# Patient Record
Sex: FEMALE | Race: WHITE | NOT HISPANIC OR LATINO | Employment: OTHER
[De-identification: names, ages, dates, MRNs, and addresses within clinical notes are randomized per-mention and may not be internally consistent; named-entity substitution may affect disease eponyms.]

---

## 2023-08-07 ENCOUNTER — TRANSCRIBE ORDERS (OUTPATIENT)
Dept: OTHER | Age: 83
End: 2023-08-07

## 2023-08-07 DIAGNOSIS — K44.9 HIATAL HERNIA WITH GASTROESOPHAGEAL REFLUX: Primary | ICD-10-CM

## 2023-08-07 DIAGNOSIS — K21.9 HIATAL HERNIA WITH GASTROESOPHAGEAL REFLUX: Primary | ICD-10-CM

## 2023-08-11 ENCOUNTER — PATIENT OUTREACH (OUTPATIENT)
Dept: ONCOLOGY | Facility: CLINIC | Age: 83
End: 2023-08-11
Payer: MEDICARE

## 2023-08-11 ENCOUNTER — TRANSCRIBE ORDERS (OUTPATIENT)
Dept: ONCOLOGY | Facility: CLINIC | Age: 83
End: 2023-08-11
Payer: MEDICARE

## 2023-08-11 DIAGNOSIS — K44.9 HIATAL HERNIA: Primary | ICD-10-CM

## 2023-08-11 NOTE — PROGRESS NOTES
New Patient Thoracic Surgery Nurse Navigator Note     Referring provider:  Briseida Lyons MD      Referring Clinic/Organization: [facility]     Referred to (specialty): Thoracic Surgery    Requested provider (if applicable): n/a     Date Referral Received: 8/11/2023     Evaluation for : hiatal hernia     Clinical History (per Nurse review of records provided):    **BOOK MARKED**     7/28/2023:  CT ANGIO CHEST  [facility]  FINDINGS: No acute pulmonary embolus.     6 mm groundglass nodule right upper lobe (6/127) stable since 10/7/2021.     Diffuse bronchial wall thickening. Lungs in expiration with expected scattered atelectasis.     Large hiatal hernia containing almost the entire stomach. Endoscopic clips within the stomach.     Mild coronary calcifications. 1.7 cm left thyroid nodule, slightly increased in size and attenuation since 2021.     Stable hepatic segment 4 cyst or hemangioma. Cholecystectomy. Calcified splenic granulomas.     IMPRESSION:   1. No acute pulmonary embolus.   2.  Diffuse bronchial wall thickening compatible bronchitis.   3.  Enlarging left thyroid nodule. Recommend outpatient thyroid ultrasound.   4.  6 mm groundglass nodule in the right upper lobe has shown stability for 2 years. Follow-up CT in 2 years is recommended.   5.  Large hiatal hernia containing almost the entire stomach.7/19/2023 CT Abdomen       7/19/2023:  And Pelvis With Contrast  [facility]  Impression    1.   Fluid within the proximal colon may be seen in the setting of   diarrhea.   2.   There are a few scattered air-fluid levels within nondilated   small bowel which may reflect mild ileus/mild enteritis. Minimal   fecalization of enteric contents noted distally.   3.   Mild bladder wall thickening. Please correlate with urinalysis   for acute cystitis.   4.   Large hiatal hernia with intrathoracic stomach with wall   thickening. Partially imaged radiopaque density within  the stomach is   indeterminate. Please correlate clinically with history of previous   surgery or procedures. Consider endoscopic correlation or upper GI   series.    Clinical Assessment / Barriers to Care (Per Nurse): none noted       Records Location (Care Everywhere, Media, etc.): EPIC     RECORDS NEEDED: Last FIVE years imaging pushed to PACS from [employer] (CT c/a/p in July 2023) & [facility]------thank you!!     Additional testing needed prior to consult: none

## 2023-08-14 ENCOUNTER — PRE VISIT (OUTPATIENT)
Dept: ONCOLOGY | Facility: CLINIC | Age: 83
End: 2023-08-14
Payer: MEDICARE

## 2023-08-14 NOTE — TELEPHONE ENCOUNTER
RECORDS NEEDED: Last FIVE years imaging pushed to PACS from [email or web address] (CT c/a/p in July 2023) & [facility]------thank you!! - Jess Costa RN     Evaluation for : hiatal hernia     Records Requested     August 14, 2023 10:47 AM  [email or web address]   Facility  [facility]  [address] [address]   Med recs ph. [phone] / Med rercs fx. [phone]    [facility] img fx. [phone]     Outcome Sent a fax for images to be mailed out on a disc from [employer], sent a fax to [facility] for images to be pushed - Amay     8/16/23 9:25AM received [facility] images, sent a 2nd fax for [place] images to be mailed out - Amay        RECORDS STATUS - ALL OTHER DIAGNOSIS      RECORDS RECEIVED FROM:    DATE RECEIVED:    NOTES STATUS DETAILS   OFFICE NOTE from referring provider Care everywhere  [facility] - [facility]   8/4/23 OV Briseida Lyons MD        DISCHARGE SUMMARY from hospital Care everywhere  [facility] : 4/5/23 - 4/8/21   OPERATIVE REPORT Care everywhere  [facility]   4/6/21, 5/11/18 EGD      MEDICATION LIST     LABS     PATHOLOGY REPORTS Care everywhere  [facility] CLINICAL LABORATORY   4/6/21 Small Intestine Duodenum, BX, R/O CELIAC (Case: [id number] )   ANYTHING RELATED TO DIAGNOSIS Care everywhere     GENONOMIC TESTING     TYPE: N/A    IMAGING (NEED IMAGES & REPORT)     [facility]  Repots: Care everywhere     Images: req 8/14/23 - received  US NECK Thyroid: 8/7/23  CT Angio Chest: 7/27/23  DEXA: 2/4/22, 1/30/2020, 1/22/18  CT CHEST: 10/7/21  XR Lumbar: 4/14/21  XT Chest: 4/5/21   [facility]  [facility]      Repots: Care everywhere     Images: req 8/14/23, 8/16/23 CT ABD PELVIS: 7/19/23    FEDEX label: [id number]

## 2023-08-21 NOTE — PROGRESS NOTES
THORACIC SURGERY - NEW PATIENT OFFICE VISIT      I saw Sonja Macias at Dr. Briseida Lyons s request in consultation for the evaluation and treatment of a giant hiatal hernia.     HPI  Ms. Sonja Macias is a 82 year old year-old female who presents for surgical evaluation of a symptomatic giant hiatal hernia that she has had for the last 20 years. She has a history of shortness of breath, early satiety, reflux and recurrent episodes of unexplained anemia for which she has had repeated upper and lower endoscopies. More recently, she has been placed on Iron infusions to treat her anemia. She has no associated chest pain or difficulty swallowing, vomiting, bloating or constipation. She has been on a proton pump inhibitor for several years.     Previsit Tests   CT chest (7/27/2023): large hiatal hernia containing almost the entire stomach. Endoscopic clips within the stomach.         Upper endoscopy (4/6/2021):   Z-line 30 cm from incisors. A large hiatal hernia was present. Esophagus was otherwise normal. Stomach was normal. There were two old clips in the gastric body which were placed in 2018. The duodenum was normal.     PMH  PUD  Large hiatal hernia with Asad ulcers  Recurrent GI bleed  Iron deficiency anemia  Fibromyalgia  Hyperlipidemia  Hypertension  Acute on chronic back pain  Diverticulosis  Capral tunnel syndrome  Hemorrhoids  Cystocele  Cataract  History of C. Difficile colitis  Reflux esophagitis  Migraines  Osteoporosis    PSH:   Upper endoscopy  Laparoscopic cholecystectomy    Medications:   Triamterene-hydrochlorothiazide  Alendronate  Atorvastatin  Iron tablets  Centrum silver  Coenzyme Q10  Lisinopril  Melatonin  Metoprolol  Omeprazole  Tylenol    ETOH - Drinks alcohol socially.   TOB - Brief remote smoking history. Quit several decades ago.   No use of illicit drugs.     Physical examination    Weight: 62.1 Kg/m2    General: Elderly woman, not in acute distress  Respiratory: Good air entry  bilaterally  Abdomen: Full, soft, moves with respiration, non-tender.     From a personal perspective, she is a substitute school nurse, lives in [place] and is here with her daughter Meaghan.       IMPRESSION No diagnosis found.   82 year old female with a symptomatic giant hiatal hernia.     PLAN    I spent a total of 30 minutes with Ms. Macias and her daughter Meaghan, more than 50% of which were spent in counseling, coordination of care, and face-to-face time. I reviewed the plan as follows:  We discussed the options of surgery vs watchful waiting. Given her symptoms, I recommend a repair rather than waiting for an emergent situation given her current good health  1) Procedure planned: Robot assisted-hiatal hernia repair, gastrostomy tube placement.  I reviewed the indications, risks, and benefits of the procedure with Ms. Macias and her daughter, Meaghan. They verbalized understanding and agreed to proceed with surgery.s I reviewed the indications, risks, and benefits of the procedure with Ms. Sonja Macias .  We discussed the risks that include but are not limited to bleeding, infection, need for reoperation, conversion to laparotomy, potential long-term failure, and death. I explained the anticipated hospital course (2-5 days) and postoperative recovery, transient dysphagia, transient diarrhea, and permanent postprandial bloating which can be mitigated with proper dietary habits. We discussed the importance of lifetime awareness to limit lifting heavy weights in case of identifying a recurrent hiatal hernia.    Necessary Preop Testing: (1) Upper and lower endoscopy (2) Repeat CBC and follow up with Dr Lyons for optimization (3) PAC  Regional Anesthesia Plan: Local anesthesia  Anticoagulation Plan: Lovenox.     They had all their questions answered and were in agreement with the plan.  I appreciate the opportunity to participate in the care of your patient and will keep you updated.  Sincerely,

## 2023-08-21 NOTE — TELEPHONE ENCOUNTER
Action August 21, 2023 10:11 AM    Action Taken Called and left a message with [employer] for an update on imgs. No movement with the tracking #.     12:44 PM:   [employer] called back, they don't have the request and they can't push to  PACS. Request is refaxed to [phone] for img disc.     2:55PM:  [employer] called and confirm disc is in the mail, won't arrive until Wednesday.    Update 8/23/23 3:48 PM CHANDNI:   Imaging disc received. Uploaded and resolved in PACS.

## 2023-08-22 ENCOUNTER — ONCOLOGY VISIT (OUTPATIENT)
Dept: SURGERY | Facility: CLINIC | Age: 83
End: 2023-08-22
Attending: STUDENT IN AN ORGANIZED HEALTH CARE EDUCATION/TRAINING PROGRAM
Payer: MEDICARE

## 2023-08-22 VITALS
DIASTOLIC BLOOD PRESSURE: 86 MMHG | HEART RATE: 72 BPM | TEMPERATURE: 98.5 F | WEIGHT: 136.9 LBS | SYSTOLIC BLOOD PRESSURE: 145 MMHG | RESPIRATION RATE: 16 BRPM | OXYGEN SATURATION: 98 %

## 2023-08-22 DIAGNOSIS — K21.00 GASTROESOPHAGEAL REFLUX DISEASE WITH ESOPHAGITIS, UNSPECIFIED WHETHER HEMORRHAGE: Primary | ICD-10-CM

## 2023-08-22 PROCEDURE — 99204 OFFICE O/P NEW MOD 45 MIN: CPT | Performed by: STUDENT IN AN ORGANIZED HEALTH CARE EDUCATION/TRAINING PROGRAM

## 2023-08-22 PROCEDURE — G0463 HOSPITAL OUTPT CLINIC VISIT: HCPCS | Performed by: STUDENT IN AN ORGANIZED HEALTH CARE EDUCATION/TRAINING PROGRAM

## 2023-08-22 ASSESSMENT — PAIN SCALES - GENERAL: PAINLEVEL: NO PAIN (0)

## 2023-08-22 NOTE — LETTER
8/22/2023         RE: Sonja Macias  [id number] [address] [facility] [address]        Dear Colleague,    Thank you for referring your patient, Sonja Macias, to the [facility]. Please see a copy of my visit note below.    THORACIC SURGERY - NEW PATIENT OFFICE VISIT      I saw Sonja Macias at Dr. Briseida Lyons s request in consultation for the evaluation and treatment of a giant hiatal hernia.     HPI  Ms. Sonja Macias is a 82 year old year-old female who presents for surgical evaluation of a symptomatic giant hiatal hernia that she has had for the last 20 years. She has a history of shortness of breath, early satiety, reflux and recurrent episodes of unexplained anemia for which she has had repeated upper and lower endoscopies. More recently, she has been placed on Iron infusions to treat her anemia. She has no associated chest pain or difficulty swallowing, vomiting, bloating or constipation. She has been on a proton pump inhibitor for several years.     Previsit Tests   CT chest (7/27/2023): large hiatal hernia containing almost the entire stomach. Endoscopic clips within the stomach.         Upper endoscopy (4/6/2021):   Z-line 30 cm from incisors. A large hiatal hernia was present. Esophagus was otherwise normal. Stomach was normal. There were two old clips in the gastric body which were placed in 2018. The duodenum was normal.     PMH  PUD  Large hiatal hernia with Asad ulcers  Recurrent GI bleed  Iron deficiency anemia  Fibromyalgia  Hyperlipidemia  Hypertension  Acute on chronic back pain  Diverticulosis  Capral tunnel syndrome  Hemorrhoids  Cystocele  Cataract  History of C. Difficile colitis  Reflux esophagitis  Migraines  Osteoporosis    PSH:   Upper endoscopy  Laparoscopic cholecystectomy    Medications:   Triamterene-hydrochlorothiazide  Alendronate  Atorvastatin  Iron tablets  Centrum silver  Coenzyme  Q10  Lisinopril  Melatonin  Metoprolol  Omeprazole  Tylenol    ETOH - Drinks alcohol socially.   TOB - Brief remote smoking history. Quit several decades ago.   No use of illicit drugs.     Physical examination    Weight: 62.1 Kg/m2    General: Elderly woman, not in acute distress  Respiratory: Good air entry bilaterally  Abdomen: Full, soft, moves with respiration, non-tender.     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       IMPRESSION No diagnosis found.   82 year old female with a symptomatic giant hiatal hernia.     PLAN    I spent a total of 30 minutes with Ms. Macias and her daughter Meaghan, more than 50% of which were spent in counseling, coordination of care, and face-to-face time. I reviewed the plan as follows:  We discussed the options of surgery vs watchful waiting. Given her symptoms, I recommend a repair rather than waiting for an emergent situation given her current good health  1) Procedure planned: Robot assisted-hiatal hernia repair, gastrostomy tube placement.  I reviewed the indications, risks, and benefits of the procedure with Ms. Macias and her daughter, Meaghan. They verbalized understanding and agreed to proceed with surgery.s I reviewed the indications, risks, and benefits of the procedure with Ms. Sonja Macias .  We discussed the risks that include but are not limited to bleeding, infection, need for reoperation, conversion to laparotomy, potential long-term failure, and death. I explained the anticipated hospital course (2-5 days) and postoperative recovery, transient dysphagia, transient diarrhea, and permanent postprandial bloating which can be mitigated with proper dietary habits. We discussed the importance of lifetime awareness to limit lifting heavy weights in case of identifying a recurrent hiatal hernia.    Necessary Preop Testing: (1) Upper and lower endoscopy (2) Repeat CBC and follow up with   Serena for optimization (3) PAC  Regional Anesthesia Plan: Local anesthesia  Anticoagulation Plan: Lovenox.     They had all their questions answered and were in agreement with the plan.  I appreciate the opportunity to participate in the care of your patient and will keep you updated.  Sincerely,       Skyla Dobbs MD

## 2023-08-22 NOTE — NURSING NOTE
Oncology Rooming Note    August 22, 2023 3:10 PM   Sonja Macias is a 82 year old female who presents for:    Chief Complaint   Patient presents with    Oncology Clinic Visit     Hiatal hernia     Initial Vitals: BP (!) 145/86 (BP Location: Right arm, Patient Position: Sitting, Cuff Size: Adult Regular)   Pulse 72   Temp 98.5  F (36.9  C) (Oral)   Resp 16   Wt 62.1 kg (136 lb 14.4 oz)   SpO2 98%  There is no height or weight on file to calculate BMI. There is no height or weight on file to calculate BSA.  No Pain (0) Comment: Data Unavailable   No LMP recorded. Patient is postmenopausal.  Allergies reviewed: Yes  Medications reviewed: Yes    Medications: Medication refills not needed today.  Pharmacy name entered into [employer]: [email or web address] - [facility], [place] - [address] [place] [address]    Clinical concerns:       Jason Miramontes

## 2023-08-27 ENCOUNTER — PREP FOR PROCEDURE (OUTPATIENT)
Dept: SURGERY | Facility: CLINIC | Age: 83
End: 2023-08-27
Payer: MEDICARE

## 2023-08-27 DIAGNOSIS — K44.9 HIATAL HERNIA: Primary | ICD-10-CM

## 2023-08-27 RX ORDER — HEPARIN SODIUM 10000 [USP'U]/ML
5000 INJECTION, SOLUTION INTRAVENOUS; SUBCUTANEOUS
Status: CANCELLED | OUTPATIENT
Start: 2023-08-27

## 2023-08-28 ENCOUNTER — TELEPHONE (OUTPATIENT)
Dept: SURGERY | Facility: CLINIC | Age: 83
End: 2023-08-28
Payer: MEDICARE

## 2023-08-28 DIAGNOSIS — K44.9 HIATAL HERNIA: Primary | ICD-10-CM

## 2023-08-28 NOTE — TELEPHONE ENCOUNTER
Spoke with pt to follow up on 8/22 visit with Dr. Dobbs.    Pt requested to have upper/lower endoscopy done at [facility]. Her daughter provides her transportation. Orders to be sent when signed.    RNCC will also clarify plan for infusions with Dr. Leyva before scheduling hernia repair with Dr. Dobbs. A message was left for Dr. Leyva's nurse to discuss plan.    Pt was in agreement and had no further questions at this time. She is aware a PAC visit will need to be completed within 30 days prior to surgery.    Torie Chinchilla RN BSN  Thoracic Surgery RN Care Coordinator  [phone]

## 2023-09-01 NOTE — TELEPHONE ENCOUNTER
Orders for upper/lower endoscopy faxed to [facility] in [place].    Torie Chinchilla, RN BSN  Thoracic Surgery RN Care Coordinator  [phone]

## 2023-09-06 NOTE — TELEPHONE ENCOUNTER
Spoke with pt who reported she is in the process of scheduling scopes in [place]. The clinic had reached out to her and she is coordinating her own transportation.    Second msg out to Dr. Leyva's nurse to discuss optimization.    Torie Chinchilla, RN BSN  Thoracic Surgery RN Care Coordinator  [phone]

## 2023-09-14 ENCOUNTER — TELEPHONE (OUTPATIENT)
Dept: SURGERY | Facility: CLINIC | Age: 83
End: 2023-09-14
Payer: MEDICARE

## 2023-09-14 NOTE — TELEPHONE ENCOUNTER
Called pt to offer 11/1 surgery date with Dr. Dobbs and got no answer. Left voicemail message with direct call back number [phone].    Shani Oneil on 9/14/2023 at 2:15 PM

## 2023-09-18 NOTE — TELEPHONE ENCOUNTER
Clinic visit note from Dr. Dobbs done 8/22 faxed to Dr. Briseida Lyons's office with note that we need to discuss iron infusions and optimization prior to surgery (will need CBC and upper/lower scopes, and PAC). Call back to RNCC requested.    Torie Chinchilla, RN BSN  Thoracic Surgery RN Care Coordinator  [phone]

## 2023-09-18 NOTE — TELEPHONE ENCOUNTER
Pt called back RNCC and confirmed she would like 11/1 date for surgery with Dr. Dobbs. RNCC sent message to surgery scheduler.     We discussed she will need to see PAC within 30 days prior. RNCC has a third message out to Dr. Lyons's office regarding plan for infusions prior to surgery.     Pt also needs to schedule upper/lower scopes prior to surgery. Pt mentions she plans to call about this tomorrow and is considering doing procedures in the [facility] where she has several grandchildren. RNCC will follow up.    Torie Chinchilla RN BSN  Thoracic Surgery RN Care Coordinator  [phone]

## 2023-09-18 NOTE — TELEPHONE ENCOUNTER
Spoke with patient to schedule procedure with Dr. Dobbs   Procedure was scheduled on 11/1 at [facility] OR  Patient will have H&P with PAC    Patient is aware a COVID-19 test is needed before their procedure ONLY IF symptomatic.   (Patient is aware Thoracic is no longer requiring COVID-19 test)       Patient is aware a / is needed day of surgery.   Surgery Letter was mailed on date: ,     Patient has my direct contact information for any further questions.

## 2023-09-19 NOTE — TELEPHONE ENCOUNTER
FUTURE VISIT INFORMATION      SURGERY INFORMATION:  Date: 23  Location: uu or  Surgeon:  Skyla Dobbs MD   Anesthesia Type:  general  Procedure: Hiatal hernia repair, ROBOT-ASSISTED, LAPAROSCOPIC, esophagogastroscopy Percutaneous insertion tube gastrostomy   Consult: ov 23    RECORDS REQUESTED FROM:       Primary Care Provider: Briseida Lyons MD  - [facility]    Most recent EKG+ Tracin23- [facility]    Most recent ECHO: 21- [facility]

## 2023-09-21 NOTE — TELEPHONE ENCOUNTER
Orders for upper/lower endoscopy faxed to [facility] radiology at [phone], requested they reach out to pt to schedule.    Per Dr. Briseida Lyons's office, pt had labs done on 9/1 and plan for infusions is tbd. Staff updated that pt is scheduled for hernia repair with Dr. Dobbs on 11/1.    Dr. Lyons is out of office today. RNCC requested call back from nurse on Monday.     Torie Chinchilla, RN BSN  Thoracic Surgery RN Care Coordinator  [phone]

## 2023-09-25 NOTE — TELEPHONE ENCOUNTER
Return call received from Dr. Lyons's office. Discussed hernia repair is scheduled for 11/1. Upper/lower endoscopes and visit needed prior. RNCC was given number for scheduling, which was given to pt. Iron infusions were an acute issue, labs to be checked prior to surgery to determine need. Pt reported that hgb that was checked on 9/1 was normal.    Pt will call RNCC back when testing is scheduled. No further questions.    Torie Chinchilla, RN BSN  Thoracic Surgery RN Care Coordinator  [phone]

## 2023-09-27 NOTE — TELEPHONE ENCOUNTER
Pt has upper/lower endoscopy scheduled for 10/6 at [facility].    We discussed avoiding vaccination for two weeks before and after surgery. Pt plans to get Covid and flu shots next week.    Toire Garcia RN BSN  Thoracic Surgery RN Care Coordinator  [phone]

## 2023-10-04 DIAGNOSIS — K44.9 HIATAL HERNIA: Primary | ICD-10-CM

## 2023-10-04 PROBLEM — Z86.19 HISTORY OF CLOSTRIDIOIDES DIFFICILE COLITIS: Status: ACTIVE | Noted: 2020-01-29

## 2023-10-04 PROBLEM — I10 HTN (HYPERTENSION): Status: ACTIVE | Noted: 2023-10-04

## 2023-10-04 PROBLEM — H26.9 CATARACT: Status: ACTIVE | Noted: 2023-10-04

## 2023-10-04 PROBLEM — Z87.19 HISTORY OF UPPER GASTROINTESTINAL BLEEDING: Status: ACTIVE | Noted: 2018-05-11

## 2023-10-04 PROBLEM — E78.5 HYPERLIPIDEMIA: Status: ACTIVE | Noted: 2023-10-04

## 2023-10-04 PROBLEM — D50.8 OTHER IRON DEFICIENCY ANEMIA: Status: ACTIVE | Noted: 2023-07-24

## 2023-10-04 PROBLEM — D64.9 ANEMIA: Status: ACTIVE | Noted: 2018-05-11

## 2023-10-04 PROBLEM — M85.80 OSTEOPENIA: Status: ACTIVE | Noted: 2023-10-04

## 2023-10-04 NOTE — TELEPHONE ENCOUNTER
Spoke with pt who stated when she called to make an appt with Dr. Lyons's office, they told her appt was not needed if she is seeing PAC. Message out to Dr. Dobbs to update and check on any labs to be added to PAC appt on 10/18. Upper/lower endoscopies are scheduled at outside facility on 10/6.    Torie Garcia, RN BSN  Thoracic Surgery RN Care Coordinator  [phone]

## 2023-10-16 DIAGNOSIS — K44.9 HIATAL HERNIA: ICD-10-CM

## 2023-10-16 DIAGNOSIS — D50.8 OTHER IRON DEFICIENCY ANEMIA: Primary | ICD-10-CM

## 2023-10-17 LAB
ABO/RH(D): NORMAL
ANTIBODY SCREEN: NEGATIVE
SPECIMEN EXPIRATION DATE: NORMAL

## 2023-10-18 ENCOUNTER — PRE VISIT (OUTPATIENT)
Dept: SURGERY | Facility: CLINIC | Age: 83
End: 2023-10-18

## 2023-10-18 ENCOUNTER — OFFICE VISIT (OUTPATIENT)
Dept: SURGERY | Facility: CLINIC | Age: 83
End: 2023-10-18
Payer: MEDICARE

## 2023-10-18 ENCOUNTER — ANESTHESIA EVENT (OUTPATIENT)
Dept: SURGERY | Facility: CLINIC | Age: 83
DRG: 328 | End: 2023-10-18
Payer: MEDICARE

## 2023-10-18 ENCOUNTER — LAB (OUTPATIENT)
Dept: LAB | Facility: CLINIC | Age: 83
End: 2023-10-18
Payer: MEDICARE

## 2023-10-18 VITALS
RESPIRATION RATE: 16 BRPM | DIASTOLIC BLOOD PRESSURE: 100 MMHG | HEIGHT: 63 IN | HEART RATE: 66 BPM | TEMPERATURE: 98.1 F | WEIGHT: 133 LBS | SYSTOLIC BLOOD PRESSURE: 158 MMHG | OXYGEN SATURATION: 96 % | BODY MASS INDEX: 23.57 KG/M2

## 2023-10-18 DIAGNOSIS — Z01.818 PREOP EXAMINATION: Primary | ICD-10-CM

## 2023-10-18 DIAGNOSIS — K44.9 HIATAL HERNIA: ICD-10-CM

## 2023-10-18 DIAGNOSIS — D50.8 OTHER IRON DEFICIENCY ANEMIA: ICD-10-CM

## 2023-10-18 LAB
ALBUMIN SERPL BCG-MCNC: 4.2 G/DL (ref 3.5–5.2)
ALP SERPL-CCNC: 87 U/L (ref 35–104)
ALT SERPL W P-5'-P-CCNC: 29 U/L (ref 0–50)
ANION GAP SERPL CALCULATED.3IONS-SCNC: 7 MMOL/L (ref 7–15)
AST SERPL W P-5'-P-CCNC: 26 U/L (ref 0–45)
BASO+EOS+MONOS # BLD AUTO: ABNORMAL 10*3/UL
BASO+EOS+MONOS NFR BLD AUTO: ABNORMAL %
BASOPHILS # BLD AUTO: 0 10E3/UL (ref 0–0.2)
BASOPHILS NFR BLD AUTO: 1 %
BILIRUB SERPL-MCNC: <0.2 MG/DL
BUN SERPL-MCNC: 13.5 MG/DL (ref 8–23)
CALCIUM SERPL-MCNC: 10.3 MG/DL (ref 8.8–10.2)
CHLORIDE SERPL-SCNC: 104 MMOL/L (ref 98–107)
CREAT SERPL-MCNC: 0.66 MG/DL (ref 0.51–0.95)
DEPRECATED HCO3 PLAS-SCNC: 27 MMOL/L (ref 22–29)
EGFRCR SERPLBLD CKD-EPI 2021: 87 ML/MIN/1.73M2
EOSINOPHIL # BLD AUTO: 0.1 10E3/UL (ref 0–0.7)
EOSINOPHIL NFR BLD AUTO: 4 %
ERYTHROCYTE [DISTWIDTH] IN BLOOD BY AUTOMATED COUNT: 13.8 % (ref 10–15)
GLUCOSE SERPL-MCNC: 130 MG/DL (ref 70–99)
HCT VFR BLD AUTO: 44.8 % (ref 35–47)
HGB BLD-MCNC: 14.3 G/DL (ref 11.7–15.7)
IMM GRANULOCYTES # BLD: 0 10E3/UL
IMM GRANULOCYTES NFR BLD: 0 %
LYMPHOCYTES # BLD AUTO: 1.5 10E3/UL (ref 0.8–5.3)
LYMPHOCYTES NFR BLD AUTO: 40 %
MCH RBC QN AUTO: 27.8 PG (ref 26.5–33)
MCHC RBC AUTO-ENTMCNC: 31.9 G/DL (ref 31.5–36.5)
MCV RBC AUTO: 87 FL (ref 78–100)
MONOCYTES # BLD AUTO: 0.4 10E3/UL (ref 0–1.3)
MONOCYTES NFR BLD AUTO: 10 %
NEUTROPHILS # BLD AUTO: 1.7 10E3/UL (ref 1.6–8.3)
NEUTROPHILS NFR BLD AUTO: 45 %
NRBC # BLD AUTO: 0 10E3/UL
NRBC BLD AUTO-RTO: 0 /100
PLATELET # BLD AUTO: 228 10E3/UL (ref 150–450)
POTASSIUM SERPL-SCNC: 3.7 MMOL/L (ref 3.4–5.3)
PROT SERPL-MCNC: 6.9 G/DL (ref 6.4–8.3)
RBC # BLD AUTO: 5.14 10E6/UL (ref 3.8–5.2)
SODIUM SERPL-SCNC: 138 MMOL/L (ref 135–145)
WBC # BLD AUTO: 3.7 10E3/UL (ref 4–11)

## 2023-10-18 PROCEDURE — 99205 OFFICE O/P NEW HI 60 MIN: CPT | Performed by: NURSE PRACTITIONER

## 2023-10-18 PROCEDURE — 86901 BLOOD TYPING SEROLOGIC RH(D): CPT | Performed by: CLINICAL NURSE SPECIALIST

## 2023-10-18 PROCEDURE — 86850 RBC ANTIBODY SCREEN: CPT | Performed by: CLINICAL NURSE SPECIALIST

## 2023-10-18 PROCEDURE — 85025 COMPLETE CBC W/AUTO DIFF WBC: CPT | Performed by: PATHOLOGY

## 2023-10-18 PROCEDURE — 36415 COLL VENOUS BLD VENIPUNCTURE: CPT | Performed by: PATHOLOGY

## 2023-10-18 PROCEDURE — 80053 COMPREHEN METABOLIC PANEL: CPT | Performed by: PATHOLOGY

## 2023-10-18 RX ORDER — MELATONIN 10 MG
10 CAPSULE ORAL
COMMUNITY
Start: 2023-02-13 | End: 2023-11-27

## 2023-10-18 RX ORDER — OMEPRAZOLE 40 MG/1
40 CAPSULE, DELAYED RELEASE ORAL
COMMUNITY
Start: 2023-07-24

## 2023-10-18 RX ORDER — LISINOPRIL 20 MG/1
1 TABLET ORAL AT BEDTIME
COMMUNITY
Start: 2023-07-24

## 2023-10-18 RX ORDER — ALENDRONATE SODIUM 70 MG/1
70 TABLET ORAL
COMMUNITY
Start: 2023-07-24

## 2023-10-18 RX ORDER — SENNOSIDES 8.6 MG
650 CAPSULE ORAL 3 TIMES DAILY PRN
COMMUNITY

## 2023-10-18 RX ORDER — METOPROLOL SUCCINATE 200 MG/1
1 TABLET, EXTENDED RELEASE ORAL AT BEDTIME
COMMUNITY
Start: 2023-09-28

## 2023-10-18 RX ORDER — MULTIPLE VITAMINS W/ MINERALS TAB 9MG-400MCG
1 TAB ORAL AT BEDTIME
COMMUNITY

## 2023-10-18 RX ORDER — TRIAMTERENE/HYDROCHLOROTHIAZID 37.5-25 MG
1 TABLET ORAL PRN
COMMUNITY
Start: 2023-02-13

## 2023-10-18 RX ORDER — AMLODIPINE BESYLATE 5 MG/1
10 TABLET ORAL EVERY MORNING
COMMUNITY
Start: 2023-09-28

## 2023-10-18 RX ORDER — PNV NO.95/FERROUS FUM/FOLIC AC 28MG-0.8MG
325 TABLET ORAL
COMMUNITY
Start: 2023-02-13

## 2023-10-18 RX ORDER — ATORVASTATIN CALCIUM 40 MG/1
1 TABLET, FILM COATED ORAL AT BEDTIME
COMMUNITY
Start: 2023-09-08

## 2023-10-18 ASSESSMENT — ENCOUNTER SYMPTOMS: SEIZURES: 0

## 2023-10-18 ASSESSMENT — PAIN SCALES - GENERAL: PAINLEVEL: NO PAIN (0)

## 2023-10-18 ASSESSMENT — LIFESTYLE VARIABLES: TOBACCO_USE: 1

## 2023-10-18 NOTE — PATIENT INSTRUCTIONS
Preparing for Your Surgery      Name:  Sonja Macias   MRN:  [id number]   :  1940   Today's Date:  10/18/2023       Arriving for surgery:  Surgery date:  23  Arrival time:  5.30AM    Please come to:     Please come to:      [facility] [address]  [address] [place]  [address]      The [facility] Patient /Visitor Ramp is located at [address]. Patients and visitors who self-park will receive the reduced hospital parking rate. If the Patient /Visitor Ramp is full, please follow the signs to the  parking located at the main hospital entrance.     parking is available ( 24 hours/ 7 days a week)    Discounted parking pass options are available for patients and visitors. They can be purchased at the [email or web address] desk at the main hospital entrance.    -    Stop at the security desk and they will direct surgery patients to the 3rd floor Surgery Waiting Room. [phone]     -  If you are in need of directions, wheelchair or escort please stop at the Information/security desk in the lobby.       What can I eat or drink?  -  You may eat and drink normally up to 8 hours prior to arrival time. (Until 9.30PM)  -  You may have clear liquids until 2 hours prior to arrival time. (Until 3.30AM)    Examples of clear liquids:  Water  Clear broth  Juices (apple, white grape, white cranberry  and cider) without pulp  Noncarbonated, powder based beverages  (lemonade and Jose G-Aid)  Sodas (Sprite, 7-Up, ginger ale and seltzer)  Coffee or tea (without milk or cream)  Gatorade    -  No Alcohol or cannabis products for at least 24 hours before surgery.     Which medicines can I take?    Hold Aspirin for 7 days before surgery.   Hold Multivitamins for 7 days before surgery.  Hold Supplements for 7 days before surgery.  Hold Ibuprofen (Advil, Motrin) for 1 day(s) before surgery--unless otherwise directed by surgeon.  Hold Naproxen  (Aleve) for 4 days before surgery.    -  DO NOT take these medications the day of surgery:  Alendronate (Fosamax), Ferrous (Iron), Triamtere-hydrochlorothiazide (Maxzide), Lisinopril (Zestril).    -  PLEASE TAKE these medications the day of surgery:  AM medications per usual except for medications listed above.     How do I prepare myself?  - Please take 2 showers (one the night prior to surgery and one the morning of surgery) using Scrubcare or Hibiclens soap.    Use this soap only from the neck to your toes.     Leave the soap on your skin for one minute--then rinse thoroughly.      You may use your own shampoo and conditioner. No other hair products.   - Please remove all jewelry and body piercings.  - No lotions, deodorants or fragrance.  - No makeup or fingernail polish.   - Bring your ID and insurance card.    -If you have a Deep Brain Stimulator, Spinal Cord Stimulator, or any Neuro Stimulator device---you must bring the remote control to the hospital.      ALL PATIENTS GOING HOME THE SAME DAY OF SURGERY ARE REQUIRED TO HAVE A RESPONSIBLE ADULT TO DRIVE AND BE IN ATTENDANCE WITH THEM FOR 24 HOURS FOLLOWING SURGERY.    Covid testing policy as of 12/06/2022  Your surgeon will notify and schedule you for a COVID test if one is needed before surgery--please direct any questions or COVID symptoms to your surgeon      Questions or Concerns:    - For any questions regarding the day of surgery or your hospital stay, please contact the Pre Admission Nursing Office at [phone].       - If you have health changes between today and your surgery, please call your surgeon.       - For questions after surgery, please call your surgeons office.           Current Visitor Guidelines    You may have 2 visitors in the pre op area.    Visiting hours: 8 a.m. to 8:30 p.m.    You may have four visitors during your inpatient hospital stay.    Patients confirmed or suspected to have symptoms of COVID 19 or flu:     No visitors  allowed for adult patients.   Children (under age 18) can have 1 named visitor.     People who are sick or showing symptoms of COVID 19 or flu:    Are not allowed to visit patients--we can only make exceptions in special situations.       Please follow these guidelines for your visit:          Please maintain social distance          Masking is optional--however at times you may be asked to wear a mask for the safety of yourself and others     Clean your hands with alcohol hand . Do this when you arrive at and leave the building and patient room,    And again after you touch your mask or anything in the room.     Go directly to and from the room you are visiting.     Stay in the patient s room during your visit. Limit going to other places in the hospital as much as possible     Leave bags and jackets at home or in the car.     For everyone s health, please don t come and go during your visit. That includes for smoking   during your visit.

## 2023-10-18 NOTE — H&P
Pre-Operative H & P     CC:  Preoperative exam to assess for increased cardiopulmonary risk while undergoing surgery and anesthesia.    Date of Encounter: 10/18/2023  Primary Care Physician:  Briseida Lyons     Reason for visit:   Encounter Diagnoses   Name Primary?    Preop examination Yes    Hiatal hernia        HPI  Sonja Macias is a 83 year old female who presents for pre-operative H & P in preparation for  Procedure Information       Case: [id number] Date/Time: 11/01/23 0730    Procedures:       Hiatal hernia repair, ROBOT-ASSISTED, LAPAROSCOPIC, esophagogastroscopy (Chest)      Percutaneous insertion tube gastrostomy (Abdomen)    Anesthesia type: General    Diagnosis: Hiatal hernia [K44.9]    Pre-op diagnosis: Hiatal hernia [K44.9]    Location:  OR [address] [place] [address] OR    Providers: Skyla Dobbs MD            The patient was seen by Dr. Dobbs in thoracic surgery consultation on 8/22/23 for the evaluation and treatment of a symptomatic giant hiatal hernia. The patient has a history of shortness of breath, early satiety, reflux and recurrent episodes of unexplained anemia for which she has had repeated upper and lower endoscopies. More recently, she has been placed on Iron infusions to treat her anemia. Through Dr. Dobbs's evaluation including reviewing imaging, the patient was counseled of the findings and treatment options. The patient has now been scheduled for the procedure as listed above.      The patient presents to the PAC in-person todaywith her daughter, Evie,  in preparation for the above scheduled procedure with comorbid conditions including GERD, HTN, HLD, iron deficient anemia, fibromyalgia, osteopenia, h/o c.Diff and h/o GIB.            History is obtained from the patient and chart review    Hx of abnormal bleeding or anti-platelet use: denies     Menstrual history: No LMP recorded. Patient is postmenopausal.:      Past Medical History  Past Medical History:   Diagnosis Date    Anemia     Anemia, iron  deficiency     Fibromyalgia     Gastroesophageal reflux disease     Hiatal hernia     Hypertension        Past Surgical History  Past Surgical History:   Procedure Laterality Date    CHOLECYSTECTOMY      UPPER GI ENDOSCOPY         Prior to Admission Medications  Current Outpatient Medications   Medication Sig Dispense Refill    alendronate (FOSAMAX) 70 MG tablet Take 70 mg by mouth every 7 days SATURDAY'S      amLODIPine (NORVASC) 5 MG tablet Take 1 tablet by mouth every morning      atorvastatin (LIPITOR) 40 MG tablet Take 1 tablet by mouth at bedtime      Ferrous Sulfate (IRON) 325 (65 Fe) MG tablet Take 325 mg by mouth every 48 hours      lisinopril (ZESTRIL) 20 MG tablet Take 1 tablet by mouth at bedtime      Melatonin 10 MG CAPS Take 10 mg by mouth nightly as needed      metoprolol succinate ER (TOPROL XL) 200 MG 24 hr tablet Take 1 tablet by mouth at bedtime      omeprazole (PRILOSEC) 40 MG DR capsule Take 40 mg by mouth daily at 4pm      triamterene-HCTZ (MAXZIDE-25) 37.5-25 MG tablet Take 1 tablet by mouth as needed Rarely uses      acetaminophen (TYLENOL) 650 MG CR tablet Take 650 mg by mouth 3 times daily as needed      multivitamin w/minerals (MULTI-VITAMIN) tablet Take 1 tablet by mouth at bedtime      SALINE NASAL SPRAY NA Spray 1 spray in nostril at bedtime         Allergies  Allergies   Allergen Reactions    Duloxetine Hcl Other (See Comments)     Impulsive. Tolerates 40mg currently. Reluctant to increase dosing further due to potential side effects when starting.       Social History  Social History     Socioeconomic History    Marital status:      Spouse name: Not on file    Number of children: Not on file    Years of education: Not on file    Highest education level: Not on file   Occupational History    Not on file   Tobacco Use    Smoking status: Never    Smokeless tobacco: Never   Substance and Sexual Activity    Alcohol use: Yes     Comment: <1 drinks per week    Drug use: Never     Sexual activity: Not on file   Other Topics Concern    Not on file   Social History Narrative    Not on file     Social Determinants of Health     Financial Resource Strain: Not on file   Food Insecurity: Not on file   Transportation Needs: Not on file   Physical Activity: Not on file   Stress: Not on file   Social Connections: Not on file   Interpersonal Safety: Not on file   Housing Stability: Not on file       Family History  Family History   Problem Relation Age of Onset    Cerebrovascular Disease Mother     Dementia Father     Diabetes Type 1 Son     Thyroid Cancer Son     Ovarian Cancer Daughter     Graves' disease Daughter     Adrenal Disorder Daughter     Hypertension Daughter        Review of Systems  The complete review of systems is negative other than noted in the HPI or here.   Anesthesia Evaluation   Pt has had prior anesthetic. Type: MAC and General.    No history of anesthetic complications       ROS/MED HX  ENT/Pulmonary:     (+)     CINDY risk factors,  hypertension,         tobacco use (Briefly while in college.), Past use,                      Neurologic: Comment: Fibromyalgia with generalized pain.  Participates in physical therapy 2X/week for past three years.     (+)    peripheral neuropathy, - 2/2 carpal tunnel s/p b/l carpal tunnel release.. migraines (Remote hx>>hormone related),                       (-) no seizures, no CVA and no TIA   Cardiovascular: Comment: Denies cardiac symptoms including chest pain, palpitations, syncope, orthopnea, or PND.  Denies any change in her HECTOR.       (+) Dyslipidemia hypertension- -   -  - -                                 Previous cardiac testing  (-) taking anticoagulants/antiplatelets   METS/Exercise Tolerance: >4 METS Comment: Walks on treadmill for 10 minutes and does weight lifting for upper body at physical therapy 2x/week. Up and down a flight of stairs multiple times  a day without symptoms. 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   "  Hematologic:     (+)      anemia (h/o anemia on iron.),       (-) history of blood transfusion   Musculoskeletal: Comment: Raynaud's in her hands.     S/p Left knee meniscus surgery      GI/Hepatic: Comment: H/o GIB.     (+)  Asymptomatic on medication,    hiatal hernia,   appendicitis (s/p appendectomy), cholecystitis/cholelithiasis (s/p cholecystectomy),          Renal/Genitourinary:  - neg Renal ROS     Endo: Comment: Osteopenia    (+)          thyroid problem, Thyroid disease - Other Thyroid nodule being followed,        (-) chronic steroid usage and obesity   Psychiatric/Substance Use:  - neg psychiatric ROS     Infectious Disease:  - neg infectious disease ROS     Malignancy:    (-) malignancy   Other:  - neg other ROS    (+)  , H/O Chronic Pain,         BP (!) 167/96 (BP Location: Right arm, Patient Position: Sitting, Cuff Size: Adult Regular)   Pulse 66   Temp 98.1  F (36.7  C) (Oral)   Resp 16   Ht 1.6 m (5' 3\")   Wt 60.3 kg (133 lb)   SpO2 96%   Breastfeeding No   BMI 23.56 kg/m      Physical Exam   Constitutional: Awake, alert, cooperative, no apparent distress, and appears stated age.  Eyes: Pupils equal, round and reactive to light, extra ocular muscles intact, sclera clear, conjunctiva normal.  HENT: Normocephalic, oral pharynx with moist mucus membranes, good dentition with upper back molar missing, and MP III. No goiter appreciated.   Respiratory: Clear to auscultation bilaterally, no crackles or wheezing.  Cardiovascular: Regular rate and rhythm, normal S1 and S2, and no murmur noted.  Carotids +2, no bruits. No edema. Palpable pulses to radial  DP and PT arteries.   GI: Normal bowel sounds, soft, non-distended, non-tender, no masses palpated, no hepatosplenomegaly.    Lymph/Hematologic: No cervical lymphadenopathy and no supraclavicular lymphadenopathy.  Skin: Warm and dry.  No rashes at anticipated surgical site.   Musculoskeletal: Full ROM of neck. There is no redness, warmth, or " swelling of the joints. Gross motor strength is normal.    Neurologic: Awake, alert, oriented to name, place and time. Cranial nerves II-XII are grossly intact. Gait is normal.   Neuropsychiatric: Calm, cooperative. Normal affect.     Prior Labs/Diagnostic Studies   All labs and imaging personally reviewed     HEMOGLOBIN A1C  Specimen: Blood - Blood specimen (specimen)  Component  Ref Range & Units 1 mo ago   Hemoglobin A1c  4.0 - 5.6 % 5.0   Estimated Average Glucose  mg/dL 97   Resulting [facility] LABORATORY     COMPREHENSIVE METABOLIC PANEL  Specimen: Blood - Blood specimen (specimen)  Component  Ref Range & Units 1 mo ago Comments   Sodium  134 - 143 mEq/L 141    Potassium  3.4 - 5.1 mEq/L 3.9    Chloride  99 - 110 mEq/L 108    Carbon Dioxide  19 - 29 mEq/L 25    Anion Gap  3.0 - 15.0 mEq/L 8.0    Blood Urea Nitrogen  5 - 24 mg/dL 14    Creatinine  0.40 - 1.00 mg/dL 0.71    Glomerular Filtration Rate  >60 mL/min/1.73 m*2 85 Risk of cardiovascular disease increases when GFR is abnormal; persistently reduced GFR values are a specific indication of CKD. This calculation uses CKD-EPI 2021 equation without adjustment for race; it has not been validated in pregnant women.   Calcium  8.4 - 10.5 mg/dL 10.2    Glucose  70 - 99 mg/dL 94    Protein, Total  6.0 - 8.0 g/dL 7.1    Albumin  3.5 - 5.0 g/dL 3.9    Alkaline Phosphatase  40 - 150 IU/L 98    Aspartate Aminotransferase  10 - 40 IU/L 23    Alanine Aminotransferase  6 - 31 IU/L 30    Bilirubin, Total  0.2 - 1.2 mg/dL 0.3    Resulting Agency [facility]        HEMOGRAM  Specimen: Blood - Blood specimen (specimen)  Component  Ref Range & Units 1 mo ago   WBC  3.2 - 11.0 10*9/L 4.4   RBC  3.77 - 5.24 10*12/L 4.84   HGB  11.2 - 15.5 g/dL 13.8   HCT  34.3 - 46.0 % 43.2   MCV  81.4 - 99.0 fL 89.3   MCH  26.7 - 33.1 pg 28.5   MCHC  31.6 - 35.5 g/dL 31.9   RDW  11.3 - 14.6 % 13.7   PLT  130 - 375 10*9/L 283   Resulting  [facility] LABORATORY     Specimen Collected: 09/01/23  9:46 AM     IRON SATURATION PANEL  Specimen: Blood - Blood specimen (specimen)  Component  Ref Range & Units 1 mo ago   Iron  22 - 146 ug/dL 59   Total Iron Binding Capacity  200 - 400 ug/dL 329   Iron Saturation  15 - 50 % 18   Transferrin  190 - 350 mg/dL 235   Resulting [facility] LABORATORY   Specimen Collected: 09/01/23  9:46 AM     FERRITIN  Specimen: Blood - Blood specimen (specimen)  Component  Ref Range & Units 1 mo ago   Ferritin  10 - 200 ng/mL 191   Resulting [facility] LABORATORY   Specimen Collected: 09/01/23  9:46 AM     PROCEDURES    CT ANGIO CHEST 7/27/2023    IMPRESSION:   1. No acute pulmonary embolus.   2.  Diffuse bronchial wall thickening compatible bronchitis.   3.  Enlarging left thyroid nodule. Recommend outpatient thyroid ultrasound.   4.  6 mm groundglass nodule in the right upper lobe has shown stability for 2 years. Follow-up CT in 2 years is recommended.   5.  Large hiatal hernia containing almost the entire stomach.     Electronically Signed: Tapan Fields 7/28/2023 9:53 AM     KEYANNA 8/2021  Interpretation Summary   Technically difficult study due to hiatal hernia.     1. Lipomatous hypertrophy of the interatrial septum - normal variant. No masses seen.     Septae   Lipomatous hypertrophy of the interatrial septum is noted. Repeat subcostal images with TTE were performed as well which showed this finding better than on previous study.     TTE 7/23/2021    Interpretation Summary   1. Normal left ventricular size with mild concentric hypertrophy. Normal LVEF 65-70%. Normal wall motion. Normal left sided filling pressure   2. Normal right ventricular size and systolic function   3. Moderately dliated left atrium. Echodensity measuring 1x1 cm on the interatrial septum on the right atrial side, possible Eustachian valve thickening or mass. Recommend KEYANNA or  CMR for further imaging.   4. No significant valvular dysfunction   5. Mildly dilated ascending aorta (3.9 cm)     No prior echo available for comparison.     CT Cardiac Calcium scoring 5/26/2023    IMPRESSION:   1. Total calcium score = 323.   2. Partially visualized 6 mm subpleural nodule in the right upper lobe. Recommend CT chest for further evaluation.   3. Large esophageal hiatal hernia.     Electronically Signed: Warren Weber 5/26/2021 1:58 PM       The patient's records and results personally reviewed by this provider.     Outside records reviewed from: Care Everywhere    LAB/DIAGNOSTIC STUDIES TODAY:     Latest Reference Range & Units 10/18/23 14:03   Sodium 135 - 145 mmol/L 138   Potassium 3.4 - 5.3 mmol/L 3.7   Chloride 98 - 107 mmol/L 104   Carbon Dioxide (CO2) 22 - 29 mmol/L 27   Urea Nitrogen 8.0 - 23.0 mg/dL 13.5   Creatinine 0.51 - 0.95 mg/dL 0.66   GFR Estimate >60 mL/min/1.73m2 87   Calcium 8.8 - 10.2 mg/dL 10.3 (H)   Anion Gap 7 - 15 mmol/L 7   Albumin 3.5 - 5.2 g/dL 4.2   Protein Total 6.4 - 8.3 g/dL 6.9   Alkaline Phosphatase 35 - 104 U/L 87   ALT 0 - 50 U/L 29   AST 0 - 45 U/L 26   Bilirubin Total <=1.2 mg/dL <0.2   Glucose 70 - 99 mg/dL 130 (H)   WBC 4.0 - 11.0 10e3/uL 3.7 (L)   Hemoglobin 11.7 - 15.7 g/dL 14.3   Hematocrit 35.0 - 47.0 % 44.8   Platelet Count 150 - 450 10e3/uL 228   RBC Count 3.80 - 5.20 10e6/uL 5.14   MCV 78 - 100 fL 87   MCH 26.5 - 33.0 pg 27.8   MCHC 31.5 - 36.5 g/dL 31.9   RDW 10.0 - 15.0 % 13.8   % Neutrophils % 45   % Lymphocytes % 40   % Monocytes % 10   % Eosinophils % 4   % Basophils % 1   Absolute Basophils 0.0 - 0.2 10e3/uL 0.0   Absolute Eosinophils 0.0 - 0.7 10e3/uL 0.1   Absolute Immature Granulocytes <=0.4 10e3/uL 0.0   Absolute Lymphocytes 0.8 - 5.3 10e3/uL 1.5   Absolute Monocytes 0.0 - 1.3 10e3/uL 0.4   % Immature Granulocytes % 0   Absolute Neutrophils 1.6 - 8.3 10e3/uL 1.7   Absolute NRBCs 10e3/uL 0.0   NRBCs per 100 WBC <1 /100 0   ABO/Rh(D)  O POS    Antibody Screen Negative  Negative   SPECIMEN EXPIRATION DATE  [id number]   (H): Data is abnormally high  (L): Data is abnormally low    Assessment    Sonja Macias is a 83 year old female seen as a PAC referral for risk assessment and optimization for anesthesia.    Plan/Recommendations  Pt will be optimized for the proposed procedure.  See below for details on the assessment, risk, and preoperative recommendations    NEUROLOGY  - No history of TIA, CVA or seizure    - Chronic Pain/fibromyalgia   ~ acetaminophen as needed    -Post Op delirium risk factors:  Age    ENT  - No current airway concerns.  Will need to be reassessed day of surgery.  Mallampati: III  TM: < 3    CARDIAC  - Denies h/o CAD or AFib    - HTN   ~ managed with lisinopril, metoprolol, amlodipine and maxide (only takes maxide as needed)   ~ Blood pressure elevated at today's exam.  Currently on ACE-I, BB, CCB and diuretic.  Does not take diuretic regularly as it causes her to be to dry. Instructed to monitor BP and f/u with PCP for further evaluation and treatment.      - HLD   ~ continue statin uninterrupted    - Coronary CT calcium score  ~ score of 323, in the 72nd percentile  ~ managed with statin     - Cardiac evaluation for HECTOR 2021>>SEE CARDIAC TESTING ABOVE    - METS (Metabolic Equivalents)  Patient performs 4 or more METS exercise without symptoms            Total Score: 0      - RCRI-Very low risk: Class 1 0.4% complication rate            Total Score: 0        PULMONARY  - CINDY Low Risk            Total Score: 2    CINDY: Hypertension    CINDY: Over 50 ys old      - pulmonary nodule being followed with imaging    - Denies asthma or inhaler use    - Tobacco History    History   Smoking Status    Never   Smokeless Tobacco    Never       GI  - GERD with Large hiatal hernia   ~ treated with PPI   ~ Not controlled on medications   ~ above procedure scheduled   ~ preoperative labs ordered    - PONV High Risk  Total Score: 3           1 AN  "PONV: Pt is Female    1 AN PONV: Patient is not a current smoker    1 AN PONV: Intended Post Op Opioids      - h/o C. Diff    - h/o GIB    /RENAL  - Baseline Creatinine  see above     ENDOCRINE    - BMI: Estimated body mass index is 23.56 kg/m  as calculated from the following:    Height as of this encounter: 1.6 m (5' 3\").    Weight as of this encounter: 60.3 kg (133 lb).  Healthy Weight (BMI 18.5-24.9)    - No history of Diabetes Mellitus    - thyroid nodule followed with imaging.     - Osteoporosis  ~ alendronate    HEME  - VTE Low Risk 0.26%            Total Score: 1    VTE: Greater than 59 yrs old      - No history of abnormal bleeding or antiplatelet use.    - Iron deficient anemia    ~ stable Hgb on iron replacement    ~ denies any h/o blood transfusions    OTHER  - Raynaud's syndrome in her hands    Different anesthesia methods/types have been discussed with the patient, but they are aware that the final plan will be decided by the assigned anesthesia provider on the date of service.      The patient is optimized for their procedure. AVS with information on surgery time/arrival time, meds and NPO status given by nursing staff. No further diagnostic testing indicated.      On the day of service:     Prep time: 20 minutes  Visit time: 29 minutes  Documentation time: 18 minutes  ------------------------------------------  Total time: 67 minutes      LYNDSAY Gasca CNP  Preoperative Assessment Center  [facility]  Clinic and Surgery Center  Phone: [phone]  Fax: [phone]    "

## 2023-10-19 ENCOUNTER — PATIENT OUTREACH (OUTPATIENT)
Dept: SURGERY | Facility: CLINIC | Age: 83
End: 2023-10-19
Payer: MEDICARE

## 2023-10-19 NOTE — PROGRESS NOTES
Call placed to pt to check in prior to hernia repair with Dr. Dobbs on 11/1/23. Pt requested list of discounted hotels to be sent via email (sent). Plans to travel with daughter on day of surgery. Reviewed g tube care, will be used for venting to relieve nausea and bloating following surgery. 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 She voices that she is eager to have surgery and has no further questions or concerns.    Torie Garcia RN BSN  Thoracic Surgery RN Care Coordinator  [phone]

## 2023-10-25 ENCOUNTER — MYC MEDICAL ADVICE (OUTPATIENT)
Dept: SURGERY | Facility: CLINIC | Age: 83
End: 2023-10-25
Payer: MEDICARE

## 2023-10-25 ENCOUNTER — PATIENT OUTREACH (OUTPATIENT)
Dept: SURGERY | Facility: CLINIC | Age: 83
End: 2023-10-25
Payer: MEDICARE

## 2023-10-25 DIAGNOSIS — K44.9 HIATAL HERNIA: Primary | ICD-10-CM

## 2023-10-25 NOTE — PROGRESS NOTES
Spoke with pt regarding robotic hernia repair scheduled with Dr. Dobbs for 11/1. The following topics were reviewed:    -No lifting > 20 lbs for 4 months  -Will be walking POD 1  -Typically 3-5 day inpatient stay (1-2 days if first repair, 2-5 days for a re-repair)  -Liquid diet for one week, progressing to soft diet until esophagram and follow up at 4 weeks, g tube used for venting will be removed at 11/27 clinic follow up   -First repair can take 2-3 hours in OR, re-repair 3-6 hours depending on amount of scar tissue and other factors  -May have a chest tube because stomach can become stuck to the lining of the lung   -Will discharge when tolerating thick liquids, pain is well controlled, having BM  -No swimming or submerging for 4 weeks while pt has GT used for venting    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 An inpatient consult for PLC will be placed to be done with pt and family.    Pt stated that she felt prepared for surgery and had no further questions.    Torie Garcia, RN BSN  Thoracic Surgery RN Care Coordinator  [phone]

## 2023-10-30 DIAGNOSIS — K44.9 HIATAL HERNIA: Primary | ICD-10-CM

## 2023-10-31 RX ORDER — OXYCODONE HYDROCHLORIDE 10 MG/1
10 TABLET ORAL
Status: CANCELLED | OUTPATIENT
Start: 2023-10-31

## 2023-10-31 RX ORDER — ONDANSETRON 4 MG/1
4 TABLET, ORALLY DISINTEGRATING ORAL EVERY 30 MIN PRN
Status: CANCELLED | OUTPATIENT
Start: 2023-10-31

## 2023-10-31 RX ORDER — ONDANSETRON 2 MG/ML
4 INJECTION INTRAMUSCULAR; INTRAVENOUS EVERY 30 MIN PRN
Status: CANCELLED | OUTPATIENT
Start: 2023-10-31

## 2023-10-31 RX ORDER — OXYCODONE HYDROCHLORIDE 5 MG/1
5 TABLET ORAL
Status: CANCELLED | OUTPATIENT
Start: 2023-10-31

## 2023-10-31 ASSESSMENT — ENCOUNTER SYMPTOMS: SEIZURES: 0

## 2023-10-31 ASSESSMENT — LIFESTYLE VARIABLES: TOBACCO_USE: 1

## 2023-10-31 NOTE — ANESTHESIA PREPROCEDURE EVALUATION
Anesthesia Pre-Procedure Evaluation    Patient: Sonja Macias   MRN: [id number] : 1940        Procedure : Procedure(s):  Hiatal hernia repair, ROBOT-ASSISTED, LAPAROSCOPIC, esophagogastroscopy  Percutaneous insertion tube gastrostomy          Past Medical History:   Diagnosis Date    Anemia     Anemia, iron deficiency     Fibromyalgia     Gastroesophageal reflux disease     Hiatal hernia     Hypertension       Past Surgical History:   Procedure Laterality Date    CHOLECYSTECTOMY      UPPER GI ENDOSCOPY        Allergies   Allergen Reactions    Duloxetine Hcl Other (See Comments)     Impulsive. Tolerates 40mg currently. Reluctant to increase dosing further due to potential side effects when starting.      Social History     Tobacco Use    Smoking status: Never    Smokeless tobacco: Never   Substance Use Topics    Alcohol use: Yes     Comment: <1 drinks per week      Wt Readings from Last 1 Encounters:   10/18/23 60.3 kg (133 lb)        Anesthesia Evaluation   Pt has had prior anesthetic. Type: MAC and General.    No history of anesthetic complications       ROS/MED HX  ENT/Pulmonary:     (+)     CINDY risk factors,  hypertension,         tobacco use (Briefly while in college.), Past use,                      Neurologic: Comment: Fibromyalgia with generalized pain.  Participates in physical therapy 2X/week for past three years.     (+)    peripheral neuropathy, - 2/2 carpal tunnel s/p b/l carpal tunnel release.. migraines (Remote hx>>hormone related),                       (-) no seizures, no CVA and no TIA   Cardiovascular: Comment: Denies cardiac symptoms including chest pain, palpitations, syncope, orthopnea, or PND.  Denies any change in her HECTOR.       (+) Dyslipidemia hypertension- -   -  - -                                 Previous cardiac testing  (-) taking anticoagulants/antiplatelets   METS/Exercise Tolerance: >4 METS Comment: Walks on treadmill for 10 minutes and does weight lifting for upper body at  "physical therapy 2x/week. Up and down a flight of stairs multiple times  a day without symptoms. 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    Hematologic:     (+)      anemia (h/o anemia on iron.),       (-) history of blood transfusion   Musculoskeletal: Comment: Raynaud's in her hands.     S/p Left knee meniscus surgery      GI/Hepatic: Comment: H/o GIB.     (+) GERD, Asymptomatic on medication,    hiatal hernia,   appendicitis (s/p appendectomy), cholecystitis/cholelithiasis (s/p cholecystectomy),          Renal/Genitourinary:  - neg Renal ROS     Endo: Comment: Osteopenia    (+)          thyroid problem, Thyroid disease - Other Thyroid nodule being followed,        (-) chronic steroid usage and obesity   Psychiatric/Substance Use:  - neg psychiatric ROS     Infectious Disease:  - neg infectious disease ROS     Malignancy:    (-) malignancy   Other:  - neg other ROS    (+)  , H/O Chronic Pain,         Physical Exam    Airway        Mallampati: I    Neck ROM: full     Respiratory Devices and Support         Dental       (+) Minor Abnormalities - some fillings, tiny chips      Cardiovascular   cardiovascular exam normal          Pulmonary   pulmonary exam normal                OUTSIDE LABS:  CBC:   Lab Results   Component Value Date    WBC 3.7 (L) 10/18/2023    HGB 14.3 10/18/2023    HCT 44.8 10/18/2023     10/18/2023     BMP:   Lab Results   Component Value Date     10/18/2023    POTASSIUM 3.7 10/18/2023    CHLORIDE 104 10/18/2023    CO2 27 10/18/2023    BUN 13.5 10/18/2023    CR 0.66 10/18/2023     (H) 10/18/2023     COAGS: No results found for: \"PTT\", \"INR\", \"FIBR\"  POC: No results found for: \"BGM\", \"HCG\", \"HCGS\"  HEPATIC:   Lab Results   Component Value Date    ALBUMIN 4.2 10/18/2023    PROTTOTAL 6.9 10/18/2023    ALT 29 10/18/2023    AST 26 10/18/2023    ALKPHOS 87 10/18/2023    BILITOTAL <0.2 10/18/2023     OTHER:   Lab Results   Component Value Date    CASANDRA 10.3 (H) 10/18/2023 "       Anesthesia Plan    ASA Status:  3    NPO Status:  NPO Appropriate    Anesthesia Type: General.     - Airway: ETT   Induction: RSI.   Maintenance: Balanced.   Techniques and Equipment:     - Lines/Monitors: 2nd IV, Arterial Line     Consents    Anesthesia Plan(s) and associated risks, benefits, and realistic alternatives discussed. Questions answered and patient/representative(s) expressed understanding.     - Discussed: Risks, Benefits and Alternatives for BOTH SEDATION and the PROCEDURE were discussed     - Discussed with:  Patient       Use of blood products discussed: Yes.     - Discussed with: Patient.     Postoperative Care    Pain management: Multi-modal analgesia.   PONV prophylaxis: Ondansetron (or other 5HT-3), Dexamethasone or Solumedrol     Comments:                Best Ambriz MD

## 2023-11-01 ENCOUNTER — HOSPITAL ENCOUNTER (INPATIENT)
Facility: CLINIC | Age: 83
LOS: 3 days | Discharge: HOME | DRG: 328 | End: 2023-11-04
Attending: STUDENT IN AN ORGANIZED HEALTH CARE EDUCATION/TRAINING PROGRAM | Admitting: STUDENT IN AN ORGANIZED HEALTH CARE EDUCATION/TRAINING PROGRAM
Payer: MEDICARE

## 2023-11-01 ENCOUNTER — APPOINTMENT (OUTPATIENT)
Dept: GENERAL RADIOLOGY | Facility: CLINIC | Age: 83
DRG: 328 | End: 2023-11-01
Attending: STUDENT IN AN ORGANIZED HEALTH CARE EDUCATION/TRAINING PROGRAM
Payer: MEDICARE

## 2023-11-01 ENCOUNTER — ANESTHESIA (OUTPATIENT)
Dept: SURGERY | Facility: CLINIC | Age: 83
DRG: 328 | End: 2023-11-01
Payer: MEDICARE

## 2023-11-01 DIAGNOSIS — K44.9 HIATAL HERNIA: Primary | ICD-10-CM

## 2023-11-01 LAB
ANION GAP SERPL CALCULATED.3IONS-SCNC: 13 MMOL/L (ref 7–15)
BUN SERPL-MCNC: 18.3 MG/DL (ref 8–23)
CALCIUM SERPL-MCNC: 9.1 MG/DL (ref 8.8–10.2)
CHLORIDE SERPL-SCNC: 108 MMOL/L (ref 98–107)
CREAT SERPL-MCNC: 0.64 MG/DL (ref 0.51–0.95)
DEPRECATED HCO3 PLAS-SCNC: 19 MMOL/L (ref 22–29)
EGFRCR SERPLBLD CKD-EPI 2021: 87 ML/MIN/1.73M2
ERYTHROCYTE [DISTWIDTH] IN BLOOD BY AUTOMATED COUNT: 14.1 % (ref 10–15)
GLUCOSE BLDC GLUCOMTR-MCNC: 81 MG/DL (ref 70–99)
GLUCOSE SERPL-MCNC: 163 MG/DL (ref 70–99)
HCT VFR BLD AUTO: 43.9 % (ref 35–47)
HGB BLD-MCNC: 13.5 G/DL (ref 11.7–15.7)
MAGNESIUM SERPL-MCNC: 1.7 MG/DL (ref 1.7–2.3)
MCH RBC QN AUTO: 27.8 PG (ref 26.5–33)
MCHC RBC AUTO-ENTMCNC: 30.8 G/DL (ref 31.5–36.5)
MCV RBC AUTO: 90 FL (ref 78–100)
PLATELET # BLD AUTO: 234 10E3/UL (ref 150–450)
POTASSIUM SERPL-SCNC: 3.9 MMOL/L (ref 3.4–5.3)
RBC # BLD AUTO: 4.86 10E6/UL (ref 3.8–5.2)
SODIUM SERPL-SCNC: 140 MMOL/L (ref 135–145)
WBC # BLD AUTO: 13.2 10E3/UL (ref 4–11)

## 2023-11-01 PROCEDURE — 360N000080 HC SURGERY LEVEL 7, PER MIN: Performed by: STUDENT IN AN ORGANIZED HEALTH CARE EDUCATION/TRAINING PROGRAM

## 2023-11-01 PROCEDURE — 258N000003 HC RX IP 258 OP 636: Performed by: NURSE ANESTHETIST, CERTIFIED REGISTERED

## 2023-11-01 PROCEDURE — 88302 TISSUE EXAM BY PATHOLOGIST: CPT | Mod: 26 | Performed by: PATHOLOGY

## 2023-11-01 PROCEDURE — 83735 ASSAY OF MAGNESIUM: CPT | Performed by: SURGERY

## 2023-11-01 PROCEDURE — 71045 X-RAY EXAM CHEST 1 VIEW: CPT | Mod: 26 | Performed by: RADIOLOGY

## 2023-11-01 PROCEDURE — 370N000017 HC ANESTHESIA TECHNICAL FEE, PER MIN: Performed by: STUDENT IN AN ORGANIZED HEALTH CARE EDUCATION/TRAINING PROGRAM

## 2023-11-01 PROCEDURE — 250N000011 HC RX IP 250 OP 636: Performed by: STUDENT IN AN ORGANIZED HEALTH CARE EDUCATION/TRAINING PROGRAM

## 2023-11-01 PROCEDURE — 43281 LAP PARAESOPHAG HERN REPAIR: CPT | Performed by: STUDENT IN AN ORGANIZED HEALTH CARE EDUCATION/TRAINING PROGRAM

## 2023-11-01 PROCEDURE — 82310 ASSAY OF CALCIUM: CPT | Performed by: SURGERY

## 2023-11-01 PROCEDURE — 250N000013 HC RX MED GY IP 250 OP 250 PS 637: Performed by: SURGERY

## 2023-11-01 PROCEDURE — 120N000002 HC R&B MED SURG/OB UMMC

## 2023-11-01 PROCEDURE — 43246 EGD PLACE GASTROSTOMY TUBE: CPT | Mod: 51 | Performed by: STUDENT IN AN ORGANIZED HEALTH CARE EDUCATION/TRAINING PROGRAM

## 2023-11-01 PROCEDURE — 999N000065 XR ABDOMEN 1 VIEW

## 2023-11-01 PROCEDURE — 0DH60UZ INSERTION OF FEEDING DEVICE INTO STOMACH, OPEN APPROACH: ICD-10-PCS | Performed by: STUDENT IN AN ORGANIZED HEALTH CARE EDUCATION/TRAINING PROGRAM

## 2023-11-01 PROCEDURE — 0BQT4ZZ REPAIR DIAPHRAGM, PERCUTANEOUS ENDOSCOPIC APPROACH: ICD-10-PCS | Performed by: STUDENT IN AN ORGANIZED HEALTH CARE EDUCATION/TRAINING PROGRAM

## 2023-11-01 PROCEDURE — 250N000011 HC RX IP 250 OP 636: Mod: JZ | Performed by: SURGERY

## 2023-11-01 PROCEDURE — 250N000011 HC RX IP 250 OP 636: Performed by: NURSE ANESTHETIST, CERTIFIED REGISTERED

## 2023-11-01 PROCEDURE — 250N000025 HC SEVOFLURANE, PER MIN: Performed by: STUDENT IN AN ORGANIZED HEALTH CARE EDUCATION/TRAINING PROGRAM

## 2023-11-01 PROCEDURE — 250N000009 HC RX 250: Performed by: STUDENT IN AN ORGANIZED HEALTH CARE EDUCATION/TRAINING PROGRAM

## 2023-11-01 PROCEDURE — 85027 COMPLETE CBC AUTOMATED: CPT | Performed by: SURGERY

## 2023-11-01 PROCEDURE — 36415 COLL VENOUS BLD VENIPUNCTURE: CPT | Performed by: SURGERY

## 2023-11-01 PROCEDURE — C9113 INJ PANTOPRAZOLE SODIUM, VIA: HCPCS | Mod: JZ | Performed by: SURGERY

## 2023-11-01 PROCEDURE — 999N000141 HC STATISTIC PRE-PROCEDURE NURSING ASSESSMENT: Performed by: STUDENT IN AN ORGANIZED HEALTH CARE EDUCATION/TRAINING PROGRAM

## 2023-11-01 PROCEDURE — 74018 RADEX ABDOMEN 1 VIEW: CPT | Mod: 26 | Performed by: RADIOLOGY

## 2023-11-01 PROCEDURE — 88302 TISSUE EXAM BY PATHOLOGIST: CPT | Mod: TC | Performed by: STUDENT IN AN ORGANIZED HEALTH CARE EDUCATION/TRAINING PROGRAM

## 2023-11-01 PROCEDURE — 258N000003 HC RX IP 258 OP 636: Performed by: SURGERY

## 2023-11-01 PROCEDURE — 999N000065 XR CHEST PORT 1 VIEW

## 2023-11-01 PROCEDURE — 272N000001 HC OR GENERAL SUPPLY STERILE: Performed by: STUDENT IN AN ORGANIZED HEALTH CARE EDUCATION/TRAINING PROGRAM

## 2023-11-01 PROCEDURE — 250N000009 HC RX 250: Performed by: NURSE ANESTHETIST, CERTIFIED REGISTERED

## 2023-11-01 PROCEDURE — 258N000003 HC RX IP 258 OP 636: Performed by: ANESTHESIOLOGY

## 2023-11-01 PROCEDURE — 710N000010 HC RECOVERY PHASE 1, LEVEL 2, PER MIN: Performed by: STUDENT IN AN ORGANIZED HEALTH CARE EDUCATION/TRAINING PROGRAM

## 2023-11-01 PROCEDURE — 93005 ELECTROCARDIOGRAM TRACING: CPT

## 2023-11-01 PROCEDURE — 8E0W4CZ ROBOTIC ASSISTED PROCEDURE OF TRUNK REGION, PERCUTANEOUS ENDOSCOPIC APPROACH: ICD-10-PCS | Performed by: STUDENT IN AN ORGANIZED HEALTH CARE EDUCATION/TRAINING PROGRAM

## 2023-11-01 RX ORDER — HYDROMORPHONE HCL IN WATER/PF 6 MG/30 ML
0.2 PATIENT CONTROLLED ANALGESIA SYRINGE INTRAVENOUS
Status: DISCONTINUED | OUTPATIENT
Start: 2023-11-01 | End: 2023-11-04

## 2023-11-01 RX ORDER — CEFAZOLIN SODIUM/WATER 2 G/20 ML
2 SYRINGE (ML) INTRAVENOUS SEE ADMIN INSTRUCTIONS
Status: DISCONTINUED | OUTPATIENT
Start: 2023-11-01 | End: 2023-11-01

## 2023-11-01 RX ORDER — HEPARIN SODIUM 5000 [USP'U]/.5ML
5000 INJECTION, SOLUTION INTRAVENOUS; SUBCUTANEOUS
Status: COMPLETED | OUTPATIENT
Start: 2023-11-01 | End: 2023-11-01

## 2023-11-01 RX ORDER — PHENYLEPHRINE HCL IN 0.9% NACL 50MG/250ML
.5-1.25 PLASTIC BAG, INJECTION (ML) INTRAVENOUS CONTINUOUS
Status: DISCONTINUED | OUTPATIENT
Start: 2023-11-01 | End: 2023-11-01 | Stop reason: HOSPADM

## 2023-11-01 RX ORDER — BUPIVACAINE HYDROCHLORIDE AND EPINEPHRINE 2.5; 5 MG/ML; UG/ML
INJECTION, SOLUTION INFILTRATION; PERINEURAL PRN
Status: DISCONTINUED | OUTPATIENT
Start: 2023-11-01 | End: 2023-11-01 | Stop reason: HOSPADM

## 2023-11-01 RX ORDER — FENTANYL CITRATE 50 UG/ML
50 INJECTION, SOLUTION INTRAMUSCULAR; INTRAVENOUS EVERY 5 MIN PRN
Status: DISCONTINUED | OUTPATIENT
Start: 2023-11-01 | End: 2023-11-01 | Stop reason: HOSPADM

## 2023-11-01 RX ORDER — NALOXONE HYDROCHLORIDE 0.4 MG/ML
0.2 INJECTION, SOLUTION INTRAMUSCULAR; INTRAVENOUS; SUBCUTANEOUS
Status: DISCONTINUED | OUTPATIENT
Start: 2023-11-01 | End: 2023-11-04 | Stop reason: HOSPADM

## 2023-11-01 RX ORDER — PROCHLORPERAZINE MALEATE 5 MG
5 TABLET ORAL EVERY 6 HOURS PRN
Status: DISCONTINUED | OUTPATIENT
Start: 2023-11-01 | End: 2023-11-04 | Stop reason: HOSPADM

## 2023-11-01 RX ORDER — AMLODIPINE BESYLATE 5 MG/1
5 TABLET ORAL EVERY MORNING
Status: DISCONTINUED | OUTPATIENT
Start: 2023-11-02 | End: 2023-11-02

## 2023-11-01 RX ORDER — SODIUM CHLORIDE, SODIUM LACTATE, POTASSIUM CHLORIDE, CALCIUM CHLORIDE 600; 310; 30; 20 MG/100ML; MG/100ML; MG/100ML; MG/100ML
INJECTION, SOLUTION INTRAVENOUS CONTINUOUS PRN
Status: DISCONTINUED | OUTPATIENT
Start: 2023-11-01 | End: 2023-11-01

## 2023-11-01 RX ORDER — ACETAMINOPHEN 325 MG/1
650 TABLET ORAL EVERY 4 HOURS PRN
Status: DISCONTINUED | OUTPATIENT
Start: 2023-11-04 | End: 2023-11-04 | Stop reason: HOSPADM

## 2023-11-01 RX ORDER — HYDROMORPHONE HCL IN WATER/PF 6 MG/30 ML
0.4 PATIENT CONTROLLED ANALGESIA SYRINGE INTRAVENOUS
Status: DISCONTINUED | OUTPATIENT
Start: 2023-11-01 | End: 2023-11-04

## 2023-11-01 RX ORDER — OXYCODONE HYDROCHLORIDE 5 MG/1
5 TABLET ORAL EVERY 4 HOURS PRN
Status: DISCONTINUED | OUTPATIENT
Start: 2023-11-01 | End: 2023-11-04 | Stop reason: HOSPADM

## 2023-11-01 RX ORDER — PROPOFOL 10 MG/ML
INJECTION, EMULSION INTRAVENOUS PRN
Status: DISCONTINUED | OUTPATIENT
Start: 2023-11-01 | End: 2023-11-01

## 2023-11-01 RX ORDER — HYDROMORPHONE HCL IN WATER/PF 6 MG/30 ML
0.2 PATIENT CONTROLLED ANALGESIA SYRINGE INTRAVENOUS EVERY 5 MIN PRN
Status: DISCONTINUED | OUTPATIENT
Start: 2023-11-01 | End: 2023-11-01 | Stop reason: HOSPADM

## 2023-11-01 RX ORDER — FENTANYL CITRATE 50 UG/ML
INJECTION, SOLUTION INTRAMUSCULAR; INTRAVENOUS PRN
Status: DISCONTINUED | OUTPATIENT
Start: 2023-11-01 | End: 2023-11-01

## 2023-11-01 RX ORDER — SODIUM CHLORIDE, SODIUM LACTATE, POTASSIUM CHLORIDE, CALCIUM CHLORIDE 600; 310; 30; 20 MG/100ML; MG/100ML; MG/100ML; MG/100ML
INJECTION, SOLUTION INTRAVENOUS CONTINUOUS
Status: DISCONTINUED | OUTPATIENT
Start: 2023-11-01 | End: 2023-11-01

## 2023-11-01 RX ORDER — HYDROMORPHONE HCL IN WATER/PF 6 MG/30 ML
0.4 PATIENT CONTROLLED ANALGESIA SYRINGE INTRAVENOUS EVERY 5 MIN PRN
Status: DISCONTINUED | OUTPATIENT
Start: 2023-11-01 | End: 2023-11-01 | Stop reason: HOSPADM

## 2023-11-01 RX ORDER — ONDANSETRON 4 MG/1
4 TABLET, ORALLY DISINTEGRATING ORAL EVERY 30 MIN PRN
Status: DISCONTINUED | OUTPATIENT
Start: 2023-11-01 | End: 2023-11-01 | Stop reason: HOSPADM

## 2023-11-01 RX ORDER — LIDOCAINE HYDROCHLORIDE 20 MG/ML
INJECTION, SOLUTION INFILTRATION; PERINEURAL PRN
Status: DISCONTINUED | OUTPATIENT
Start: 2023-11-01 | End: 2023-11-01

## 2023-11-01 RX ORDER — SODIUM CHLORIDE, SODIUM LACTATE, POTASSIUM CHLORIDE, CALCIUM CHLORIDE 600; 310; 30; 20 MG/100ML; MG/100ML; MG/100ML; MG/100ML
INJECTION, SOLUTION INTRAVENOUS CONTINUOUS
Status: DISCONTINUED | OUTPATIENT
Start: 2023-11-01 | End: 2023-11-04

## 2023-11-01 RX ORDER — SODIUM CHLORIDE, SODIUM LACTATE, POTASSIUM CHLORIDE, CALCIUM CHLORIDE 600; 310; 30; 20 MG/100ML; MG/100ML; MG/100ML; MG/100ML
INJECTION, SOLUTION INTRAVENOUS CONTINUOUS
Status: DISCONTINUED | OUTPATIENT
Start: 2023-11-01 | End: 2023-11-01 | Stop reason: HOSPADM

## 2023-11-01 RX ORDER — ONDANSETRON 4 MG/1
4 TABLET, ORALLY DISINTEGRATING ORAL EVERY 6 HOURS PRN
Status: DISCONTINUED | OUTPATIENT
Start: 2023-11-01 | End: 2023-11-04 | Stop reason: HOSPADM

## 2023-11-01 RX ORDER — ONDANSETRON 2 MG/ML
4 INJECTION INTRAMUSCULAR; INTRAVENOUS EVERY 6 HOURS PRN
Status: DISCONTINUED | OUTPATIENT
Start: 2023-11-01 | End: 2023-11-04 | Stop reason: HOSPADM

## 2023-11-01 RX ORDER — IPRATROPIUM BROMIDE AND ALBUTEROL SULFATE 2.5; .5 MG/3ML; MG/3ML
3 SOLUTION RESPIRATORY (INHALATION)
Status: DISCONTINUED | OUTPATIENT
Start: 2023-11-01 | End: 2023-11-04 | Stop reason: HOSPADM

## 2023-11-01 RX ORDER — ACETAMINOPHEN 325 MG/1
975 TABLET ORAL EVERY 8 HOURS
Status: COMPLETED | OUTPATIENT
Start: 2023-11-01 | End: 2023-11-04

## 2023-11-01 RX ORDER — HEPARIN SODIUM 5000 [USP'U]/.5ML
5000 INJECTION, SOLUTION INTRAVENOUS; SUBCUTANEOUS EVERY 8 HOURS
Status: DISCONTINUED | OUTPATIENT
Start: 2023-11-02 | End: 2023-11-04 | Stop reason: HOSPADM

## 2023-11-01 RX ORDER — LIDOCAINE 40 MG/G
CREAM TOPICAL
Status: DISCONTINUED | OUTPATIENT
Start: 2023-11-01 | End: 2023-11-04 | Stop reason: HOSPADM

## 2023-11-01 RX ORDER — AMOXICILLIN 250 MG
1 CAPSULE ORAL 2 TIMES DAILY
Status: DISCONTINUED | OUTPATIENT
Start: 2023-11-01 | End: 2023-11-04 | Stop reason: HOSPADM

## 2023-11-01 RX ORDER — LABETALOL HYDROCHLORIDE 5 MG/ML
10 INJECTION, SOLUTION INTRAVENOUS
Status: DISCONTINUED | OUTPATIENT
Start: 2023-11-01 | End: 2023-11-01 | Stop reason: HOSPADM

## 2023-11-01 RX ORDER — NALOXONE HYDROCHLORIDE 0.4 MG/ML
0.4 INJECTION, SOLUTION INTRAMUSCULAR; INTRAVENOUS; SUBCUTANEOUS
Status: DISCONTINUED | OUTPATIENT
Start: 2023-11-01 | End: 2023-11-04 | Stop reason: HOSPADM

## 2023-11-01 RX ORDER — POLYETHYLENE GLYCOL 3350 17 G/17G
17 POWDER, FOR SOLUTION ORAL DAILY
Status: DISCONTINUED | OUTPATIENT
Start: 2023-11-02 | End: 2023-11-04 | Stop reason: HOSPADM

## 2023-11-01 RX ORDER — LIDOCAINE 40 MG/G
CREAM TOPICAL
Status: DISCONTINUED | OUTPATIENT
Start: 2023-11-01 | End: 2023-11-01

## 2023-11-01 RX ORDER — EPHEDRINE SULFATE 50 MG/ML
INJECTION, SOLUTION INTRAMUSCULAR; INTRAVENOUS; SUBCUTANEOUS PRN
Status: DISCONTINUED | OUTPATIENT
Start: 2023-11-01 | End: 2023-11-01

## 2023-11-01 RX ORDER — ONDANSETRON 2 MG/ML
INJECTION INTRAMUSCULAR; INTRAVENOUS PRN
Status: DISCONTINUED | OUTPATIENT
Start: 2023-11-01 | End: 2023-11-01

## 2023-11-01 RX ORDER — CEFAZOLIN SODIUM/WATER 2 G/20 ML
2 SYRINGE (ML) INTRAVENOUS
Status: COMPLETED | OUTPATIENT
Start: 2023-11-01 | End: 2023-11-01

## 2023-11-01 RX ORDER — PANTOPRAZOLE SODIUM 40 MG/1
40 TABLET, DELAYED RELEASE ORAL DAILY
Status: DISCONTINUED | OUTPATIENT
Start: 2023-11-01 | End: 2023-11-04 | Stop reason: HOSPADM

## 2023-11-01 RX ORDER — BISACODYL 10 MG
10 SUPPOSITORY, RECTAL RECTAL DAILY PRN
Status: DISCONTINUED | OUTPATIENT
Start: 2023-11-01 | End: 2023-11-04 | Stop reason: HOSPADM

## 2023-11-01 RX ORDER — GLYCOPYRROLATE 0.2 MG/ML
INJECTION, SOLUTION INTRAMUSCULAR; INTRAVENOUS PRN
Status: DISCONTINUED | OUTPATIENT
Start: 2023-11-01 | End: 2023-11-01

## 2023-11-01 RX ORDER — HYDRALAZINE HYDROCHLORIDE 20 MG/ML
2.5-5 INJECTION INTRAMUSCULAR; INTRAVENOUS EVERY 10 MIN PRN
Status: DISCONTINUED | OUTPATIENT
Start: 2023-11-01 | End: 2023-11-01 | Stop reason: HOSPADM

## 2023-11-01 RX ORDER — METOPROLOL TARTRATE 1 MG/ML
5 INJECTION, SOLUTION INTRAVENOUS EVERY 6 HOURS
Status: DISCONTINUED | OUTPATIENT
Start: 2023-11-02 | End: 2023-11-04

## 2023-11-01 RX ORDER — OXYCODONE HYDROCHLORIDE 10 MG/1
10 TABLET ORAL EVERY 4 HOURS PRN
Status: DISCONTINUED | OUTPATIENT
Start: 2023-11-01 | End: 2023-11-04 | Stop reason: HOSPADM

## 2023-11-01 RX ORDER — DEXAMETHASONE SODIUM PHOSPHATE 4 MG/ML
INJECTION, SOLUTION INTRA-ARTICULAR; INTRALESIONAL; INTRAMUSCULAR; INTRAVENOUS; SOFT TISSUE PRN
Status: DISCONTINUED | OUTPATIENT
Start: 2023-11-01 | End: 2023-11-01

## 2023-11-01 RX ORDER — ONDANSETRON 2 MG/ML
4 INJECTION INTRAMUSCULAR; INTRAVENOUS EVERY 30 MIN PRN
Status: DISCONTINUED | OUTPATIENT
Start: 2023-11-01 | End: 2023-11-01 | Stop reason: HOSPADM

## 2023-11-01 RX ORDER — FENTANYL CITRATE 50 UG/ML
25 INJECTION, SOLUTION INTRAMUSCULAR; INTRAVENOUS EVERY 5 MIN PRN
Status: DISCONTINUED | OUTPATIENT
Start: 2023-11-01 | End: 2023-11-01 | Stop reason: HOSPADM

## 2023-11-01 RX ADMIN — LIDOCAINE HYDROCHLORIDE 100 MG: 20 INJECTION, SOLUTION INFILTRATION; PERINEURAL at 07:49

## 2023-11-01 RX ADMIN — Medication 20 MG: at 08:22

## 2023-11-01 RX ADMIN — HEPARIN SODIUM 5000 UNITS: 5000 INJECTION, SOLUTION INTRAVENOUS; SUBCUTANEOUS at 07:20

## 2023-11-01 RX ADMIN — FENTANYL CITRATE 50 MCG: 50 INJECTION INTRAMUSCULAR; INTRAVENOUS at 08:37

## 2023-11-01 RX ADMIN — EPHEDRINE SULFATE 10 MG: 5 INJECTION INTRAVENOUS at 08:08

## 2023-11-01 RX ADMIN — GLYCOPYRROLATE 0.2 MG: 0.2 INJECTION, SOLUTION INTRAMUSCULAR; INTRAVENOUS at 08:08

## 2023-11-01 RX ADMIN — PHENYLEPHRINE HYDROCHLORIDE 50 MCG: 10 INJECTION INTRAVENOUS at 11:49

## 2023-11-01 RX ADMIN — PHENYLEPHRINE HYDROCHLORIDE 100 MCG: 10 INJECTION INTRAVENOUS at 10:27

## 2023-11-01 RX ADMIN — PANTOPRAZOLE SODIUM 40 MG: 40 INJECTION, POWDER, FOR SOLUTION INTRAVENOUS at 21:25

## 2023-11-01 RX ADMIN — PROPOFOL 100 MG: 10 INJECTION, EMULSION INTRAVENOUS at 07:49

## 2023-11-01 RX ADMIN — HYDROMORPHONE HYDROCHLORIDE 0.5 MG: 1 INJECTION, SOLUTION INTRAMUSCULAR; INTRAVENOUS; SUBCUTANEOUS at 12:09

## 2023-11-01 RX ADMIN — HYDROMORPHONE HYDROCHLORIDE 0.2 MG: 0.2 INJECTION, SOLUTION INTRAMUSCULAR; INTRAVENOUS; SUBCUTANEOUS at 15:29

## 2023-11-01 RX ADMIN — PHENYLEPHRINE HYDROCHLORIDE 50 MCG: 10 INJECTION INTRAVENOUS at 12:00

## 2023-11-01 RX ADMIN — FENTANYL CITRATE 25 MCG: 50 INJECTION, SOLUTION INTRAMUSCULAR; INTRAVENOUS at 13:39

## 2023-11-01 RX ADMIN — ONDANSETRON 4 MG: 2 INJECTION INTRAMUSCULAR; INTRAVENOUS at 08:20

## 2023-11-01 RX ADMIN — OXYCODONE HYDROCHLORIDE 10 MG: 10 TABLET ORAL at 21:27

## 2023-11-01 RX ADMIN — PHENYLEPHRINE HYDROCHLORIDE 50 MCG: 10 INJECTION INTRAVENOUS at 09:21

## 2023-11-01 RX ADMIN — HYDROMORPHONE HYDROCHLORIDE 0.2 MG: 0.2 INJECTION, SOLUTION INTRAMUSCULAR; INTRAVENOUS; SUBCUTANEOUS at 14:55

## 2023-11-01 RX ADMIN — FENTANYL CITRATE 50 MCG: 50 INJECTION INTRAMUSCULAR; INTRAVENOUS at 08:27

## 2023-11-01 RX ADMIN — EPHEDRINE SULFATE 5 MG: 5 INJECTION INTRAVENOUS at 09:21

## 2023-11-01 RX ADMIN — Medication 20 MG: at 11:45

## 2023-11-01 RX ADMIN — SODIUM CHLORIDE, POTASSIUM CHLORIDE, SODIUM LACTATE AND CALCIUM CHLORIDE: 600; 310; 30; 20 INJECTION, SOLUTION INTRAVENOUS at 07:45

## 2023-11-01 RX ADMIN — Medication 2 G: at 12:04

## 2023-11-01 RX ADMIN — SODIUM CHLORIDE, POTASSIUM CHLORIDE, SODIUM LACTATE AND CALCIUM CHLORIDE: 600; 310; 30; 20 INJECTION, SOLUTION INTRAVENOUS at 08:22

## 2023-11-01 RX ADMIN — HYDROMORPHONE HYDROCHLORIDE 0.4 MG: 0.2 INJECTION, SOLUTION INTRAMUSCULAR; INTRAVENOUS; SUBCUTANEOUS at 16:19

## 2023-11-01 RX ADMIN — Medication 2 G: at 08:03

## 2023-11-01 RX ADMIN — SENNOSIDES AND DOCUSATE SODIUM 1 TABLET: 50; 8.6 TABLET ORAL at 21:26

## 2023-11-01 RX ADMIN — FENTANYL CITRATE 50 MCG: 50 INJECTION INTRAMUSCULAR; INTRAVENOUS at 12:28

## 2023-11-01 RX ADMIN — ACETAMINOPHEN 975 MG: 325 TABLET, FILM COATED ORAL at 21:26

## 2023-11-01 RX ADMIN — HYDROMORPHONE HYDROCHLORIDE 0.2 MG: 0.2 INJECTION, SOLUTION INTRAMUSCULAR; INTRAVENOUS; SUBCUTANEOUS at 15:53

## 2023-11-01 RX ADMIN — ONDANSETRON 4 MG: 2 INJECTION INTRAMUSCULAR; INTRAVENOUS at 12:16

## 2023-11-01 RX ADMIN — PROPOFOL 20 MG: 10 INJECTION, EMULSION INTRAVENOUS at 12:28

## 2023-11-01 RX ADMIN — Medication 50 MG: at 07:50

## 2023-11-01 RX ADMIN — Medication 20 MG: at 09:17

## 2023-11-01 RX ADMIN — PROPOFOL 30 MG: 10 INJECTION, EMULSION INTRAVENOUS at 12:16

## 2023-11-01 RX ADMIN — DEXAMETHASONE SODIUM PHOSPHATE 8 MG: 4 INJECTION, SOLUTION INTRA-ARTICULAR; INTRALESIONAL; INTRAMUSCULAR; INTRAVENOUS; SOFT TISSUE at 08:20

## 2023-11-01 RX ADMIN — EPHEDRINE SULFATE 10 MG: 5 INJECTION INTRAVENOUS at 08:32

## 2023-11-01 RX ADMIN — PHENYLEPHRINE HYDROCHLORIDE 100 MCG: 10 INJECTION INTRAVENOUS at 11:11

## 2023-11-01 RX ADMIN — PHENYLEPHRINE HYDROCHLORIDE 50 MCG: 10 INJECTION INTRAVENOUS at 10:00

## 2023-11-01 RX ADMIN — SODIUM CHLORIDE, POTASSIUM CHLORIDE, SODIUM LACTATE AND CALCIUM CHLORIDE 500 ML: 600; 310; 30; 20 INJECTION, SOLUTION INTRAVENOUS at 15:09

## 2023-11-01 RX ADMIN — FENTANYL CITRATE 25 MCG: 50 INJECTION, SOLUTION INTRAMUSCULAR; INTRAVENOUS at 14:02

## 2023-11-01 RX ADMIN — SUGAMMADEX 200 MG: 100 INJECTION, SOLUTION INTRAVENOUS at 12:26

## 2023-11-01 RX ADMIN — Medication 20 MG: at 10:21

## 2023-11-01 RX ADMIN — SODIUM CHLORIDE, POTASSIUM CHLORIDE, SODIUM LACTATE AND CALCIUM CHLORIDE: 600; 310; 30; 20 INJECTION, SOLUTION INTRAVENOUS at 15:38

## 2023-11-01 RX ADMIN — FENTANYL CITRATE 50 MCG: 50 INJECTION INTRAMUSCULAR; INTRAVENOUS at 12:41

## 2023-11-01 ASSESSMENT — ACTIVITIES OF DAILY LIVING (ADL)
ADLS_ACUITY_SCORE: 20
ADLS_ACUITY_SCORE: 20
ADLS_ACUITY_SCORE: 31
ADLS_ACUITY_SCORE: 20
ADLS_ACUITY_SCORE: 31
ADLS_ACUITY_SCORE: 20
ADLS_ACUITY_SCORE: 20

## 2023-11-01 NOTE — ANESTHESIA CARE TRANSFER NOTE
Patient: Sonja Macias    Procedure: Procedure(s):  Robot-Assisted Laparoscopic Hiatal Hernia Repair wih Esophagogastroscopy, Left Chest Tube  Percutaneous Insertion Gastrostomy Tube       Diagnosis: Hiatal hernia [K44.9]  Diagnosis Additional Information: No value filed.    Anesthesia Type:   General     Note:    Oropharynx: spontaneously breathing and oral airway in place  Level of Consciousness: drowsy  Oxygen Supplementation: face mask  Level of Supplemental Oxygen (L/min / FiO2): 6  Independent Airway: airway patency satisfactory and stable  Dentition: dentition unchanged  Vital Signs Stable: post-procedure vital signs reviewed and stable  Report to RN Given: handoff report given  Patient transferred to: PACU    Handoff Report: Identifed the Patient, Identified the Reponsible Provider, Reviewed the pertinent medical history, Discussed the surgical course, Reviewed Intra-OP anesthesia mangement and issues during anesthesia, Set expectations for post-procedure period and Allowed opportunity for questions and acknowledgement of understanding      Vitals:  Vitals Value Taken Time   /74 11/01/23 1300   Temp     Pulse 70 11/01/23 1303   Resp 16 11/01/23 1303   SpO2 98 % 11/01/23 1303   Vitals shown include unfiled device data.    Electronically Signed By: LYNDSAY Candelario CRNA  November 1, 2023  1:04 PM

## 2023-11-01 NOTE — OR NURSING
Per Thoracic Jossie LYN, chest and abdominal xrays reviewed by thoracic and pt cleared by thoracic for transfer. Chest tube to be to water seal per MD EBONI to updated order.     Addendum 1500: Bolus ordered per anesthesia, see MAR.

## 2023-11-01 NOTE — ANESTHESIA PROCEDURE NOTES
Airway       Patient location during procedure: OR       Procedure Start/Stop Times: 11/1/2023 7:54 AM  Staff -        CRNA: Catia Santana APRN CRNA       Performed By: CRNA  Consent for Airway        Urgency: elective  Indications and Patient Condition       Indications for airway management: hailey-procedural       Induction type:intravenous       Mask difficulty assessment: 2 - vent by mask + OA or adjuvant +/- NMBA    Final Airway Details       Final airway type: endotracheal airway       Successful airway: ETT - single  Endotracheal Airway Details        ETT size (mm): 7.0       Cuffed: yes       Successful intubation technique: direct laryngoscopy       DL Blade Type: Mayorga 2       Grade View of Cords: 1       Adjucts: stylet       Position: Right       Measured from: gums/teeth       Secured at (cm): 21       Bite block used: None    Post intubation assessment        Placement verified by: capnometry, equal breath sounds and chest rise        Number of attempts at approach: 1       Secured with: pink tape       Ease of procedure: easy       Dentition: Intact and Unchanged    Medication(s) Administered   Medication Administration Time: 11/1/2023 7:54 AM

## 2023-11-01 NOTE — PROGRESS NOTES
Call placed to PACU to get report on patient, per RN caring for patient, she will give me a call back because patient is not yet ready.

## 2023-11-01 NOTE — OR NURSING
Patient slow to wake up, complaining of pain when awake, pain management limited d/t drowsiness. MD, Juanpablo, updated. Continue to monitor.

## 2023-11-01 NOTE — OR NURSING
No reported output intra-op, bladder scanned for only about 100 mL. MD Anesthesia and Thoracic MDJossie, notified. No new orders at this time.

## 2023-11-01 NOTE — OR NURSING
Concern for urinary retention d/t time since last void. Patient reports history of cystocele, limiting bladder scan accuracy. Juanpablo LYN, updated straight cath ordered. Patient straight cathed for 700 mL.

## 2023-11-01 NOTE — PROGRESS NOTES
"  Thoracic Surgery Progress Note  Surgery Cross-Cover  Post Op Check    11/01/2023    Sonja Macias is a 83 year old female POD#0 s/p Procedure(s):  Robot-Assisted Laparoscopic Hiatal Hernia Repair wih Esophagogastroscopy, Left Chest Tube  Percutaneous Insertion Gastrostomy Tube for Pre-Op Diagnosis Codes:     * Hiatal hernia [K44.9]    Pt reports their pain is controlled with current regimen. Denies nausea, chest pain, or dizziness. Patient is not passing flatus or having bowel movements and Is voiding spontaneously and via straight cath x1. Patient complaining of mild SOB however not tachypnic and on 2L NC. Per bedside PACU RN, patient has slight vaginal prolapse, bladder scan equivocal for retention however straight cath'ed for 700 mL.     /83   Pulse 75   Temp 98.3  F (36.8  C)   Resp 15   Ht 1.6 m (5' 3\")   Wt 61.3 kg (135 lb 2.3 oz)   SpO2 94%   BMI 23.94 kg/m      Gen: A&O x4, NAD   Chest: breathing non-labored on 2L, left chest tube to water seal  Abdomen: soft, non-tender, non-distended, tympanic   Incision: clean, dry, intact covered by dressings without strikethrough  Extremities: warm and well perfused with SCDs on  Devices: left chest tube to water seal with minimal serosanguinous drainage    A/P: No acute post-op issues. Continue plan of care per primary team. Please call with any questions.    Renan Sethi MD  PGY-1 Surgery    "

## 2023-11-01 NOTE — BRIEF OP NOTE
[facility]    Brief Operative Note    Pre-operative diagnosis: Hiatal hernia [K44.9]  Post-operative diagnosis Same as pre-operative diagnosis    Procedure: Robot-Assisted Laparoscopic Hiatal Hernia Repair wih Esophagogastroscopy, N/A - Chest  Percutaneous Insertion Gastrostomy Tube, N/A - Abdomen    Surgeon: Surgeon(s) and Role:     * Skyla Dobbs MD - Primary     * Renan Sethi MD - Resident - Observing  Anesthesia: General   Estimated Blood Loss: Less than 50 ml    Drains: Left chest tube  Specimens:   ID Type Source Tests Collected by Time Destination   1 : Hernia Sac Tissue Hernia Sac SURGICAL PATHOLOGY EXAM Skyla Dobbs MD 11/1/2023 10:20 AM      Findings:   Giant type 3 paraesophageal hernia . Hiatal hernia repair. EGD. PEG tube placement.  Complications: None.  Implants: * No implants in log *

## 2023-11-01 NOTE — ANESTHESIA POSTPROCEDURE EVALUATION
Patient: Sonja Macias    Procedure: Procedure(s):  Robot-Assisted Laparoscopic Hiatal Hernia Repair wih Esophagogastroscopy, Left Chest Tube  Percutaneous Insertion Gastrostomy Tube       Anesthesia Type:  General    Note:  Disposition: Inpatient   Postop Pain Control: Uneventful            Sign Out: Well controlled pain   PONV: No   Neuro/Psych: Uneventful            Sign Out: Acceptable/Baseline neuro status   Airway/Respiratory: Uneventful            Sign Out: Acceptable/Baseline resp. status   CV/Hemodynamics: Uneventful            Sign Out: Acceptable CV status; No obvious hypovolemia; No obvious fluid overload   Other NRE: NONE   DID A NON-ROUTINE EVENT OCCUR? No           Last vitals:  Vitals Value Taken Time   /83 11/01/23 1415   Temp 36.3  C (97.4  F) 11/01/23 1300   Pulse 80 11/01/23 1427   Resp 18 11/01/23 1427   SpO2 94 % 11/01/23 1427   Vitals shown include unfiled device data.    Electronically Signed By: Matthias Teague MD  November 1, 2023  2:28 PM

## 2023-11-01 NOTE — ANESTHESIA PROCEDURE NOTES
Arterial Line Procedure Note    Pre-Procedure   Staff -        Anesthesiologist:  Matthias Teague MD       Performed By: anesthesiologist       Location: OR       Pre-Anesthestic Checklist: patient identified, IV checked, risks and benefits discussed, informed consent, monitors and equipment checked, pre-op evaluation and at physician/surgeon's request  Timeout:       Correct Patient: Yes        Correct Procedure: Yes        Correct Site: Yes        Correct Position: Yes   Line Placement:   This line was placed Post Induction starting at 11/1/2023 7:50 AM and ending at 11/1/2023 8:00 AM  Procedure   Procedure: arterial line and new line       Laterality: left       Insertion Site: radial.  Sterile Prep        Standard elements of sterile barrier followed       Skin prep: Chloraprep  Insertion/Injection        Technique: Seldinger Technique        Catheter Type/Size: 20 G, 12 cm  Narrative         Secured by: suture       Tegaderm dressing used.       Complications: None apparent,        Arterial waveform: Yes        IBP within 10% of NIBP: Yes

## 2023-11-02 ENCOUNTER — APPOINTMENT (OUTPATIENT)
Dept: PHYSICAL THERAPY | Facility: CLINIC | Age: 83
DRG: 328 | End: 2023-11-02
Attending: STUDENT IN AN ORGANIZED HEALTH CARE EDUCATION/TRAINING PROGRAM
Payer: MEDICARE

## 2023-11-02 ENCOUNTER — APPOINTMENT (OUTPATIENT)
Dept: OCCUPATIONAL THERAPY | Facility: CLINIC | Age: 83
DRG: 328 | End: 2023-11-02
Attending: STUDENT IN AN ORGANIZED HEALTH CARE EDUCATION/TRAINING PROGRAM
Payer: MEDICARE

## 2023-11-02 ENCOUNTER — APPOINTMENT (OUTPATIENT)
Dept: GENERAL RADIOLOGY | Facility: CLINIC | Age: 83
DRG: 328 | End: 2023-11-02
Attending: STUDENT IN AN ORGANIZED HEALTH CARE EDUCATION/TRAINING PROGRAM
Payer: MEDICARE

## 2023-11-02 LAB
ANION GAP SERPL CALCULATED.3IONS-SCNC: 11 MMOL/L (ref 7–15)
ATRIAL RATE - MUSE: 59 BPM
BUN SERPL-MCNC: 14 MG/DL (ref 8–23)
CALCIUM SERPL-MCNC: 9.2 MG/DL (ref 8.8–10.2)
CHLORIDE SERPL-SCNC: 103 MMOL/L (ref 98–107)
CREAT SERPL-MCNC: 0.49 MG/DL (ref 0.51–0.95)
DEPRECATED HCO3 PLAS-SCNC: 22 MMOL/L (ref 22–29)
DIASTOLIC BLOOD PRESSURE - MUSE: NORMAL MMHG
EGFRCR SERPLBLD CKD-EPI 2021: >90 ML/MIN/1.73M2
ERYTHROCYTE [DISTWIDTH] IN BLOOD BY AUTOMATED COUNT: 14.2 % (ref 10–15)
GLUCOSE BLDC GLUCOMTR-MCNC: 120 MG/DL (ref 70–99)
GLUCOSE SERPL-MCNC: 122 MG/DL (ref 70–99)
HCT VFR BLD AUTO: 39.5 % (ref 35–47)
HGB BLD-MCNC: 12.8 G/DL (ref 11.7–15.7)
INTERPRETATION ECG - MUSE: NORMAL
MAGNESIUM SERPL-MCNC: 1.6 MG/DL (ref 1.7–2.3)
MCH RBC QN AUTO: 27.7 PG (ref 26.5–33)
MCHC RBC AUTO-ENTMCNC: 32.4 G/DL (ref 31.5–36.5)
MCV RBC AUTO: 86 FL (ref 78–100)
P AXIS - MUSE: 38 DEGREES
PHOSPHATE SERPL-MCNC: 3 MG/DL (ref 2.5–4.5)
PLATELET # BLD AUTO: 233 10E3/UL (ref 150–450)
POTASSIUM SERPL-SCNC: 4.2 MMOL/L (ref 3.4–5.3)
PR INTERVAL - MUSE: 178 MS
QRS DURATION - MUSE: 92 MS
QT - MUSE: 420 MS
QTC - MUSE: 415 MS
R AXIS - MUSE: -6 DEGREES
RBC # BLD AUTO: 4.62 10E6/UL (ref 3.8–5.2)
SODIUM SERPL-SCNC: 136 MMOL/L (ref 135–145)
SYSTOLIC BLOOD PRESSURE - MUSE: NORMAL MMHG
T AXIS - MUSE: 28 DEGREES
VENTRICULAR RATE- MUSE: 59 BPM
WBC # BLD AUTO: 11.1 10E3/UL (ref 4–11)

## 2023-11-02 PROCEDURE — 250N000009 HC RX 250: Performed by: SURGERY

## 2023-11-02 PROCEDURE — 85027 COMPLETE CBC AUTOMATED: CPT

## 2023-11-02 PROCEDURE — 97161 PT EVAL LOW COMPLEX 20 MIN: CPT | Mod: GP

## 2023-11-02 PROCEDURE — 84100 ASSAY OF PHOSPHORUS: CPT

## 2023-11-02 PROCEDURE — 71045 X-RAY EXAM CHEST 1 VIEW: CPT | Mod: 26 | Performed by: RADIOLOGY

## 2023-11-02 PROCEDURE — 97535 SELF CARE MNGMENT TRAINING: CPT | Mod: GO

## 2023-11-02 PROCEDURE — 80048 BASIC METABOLIC PNL TOTAL CA: CPT

## 2023-11-02 PROCEDURE — 83735 ASSAY OF MAGNESIUM: CPT

## 2023-11-02 PROCEDURE — 97165 OT EVAL LOW COMPLEX 30 MIN: CPT | Mod: GO

## 2023-11-02 PROCEDURE — 999N000128 HC STATISTIC PERIPHERAL IV START W/O US GUIDANCE

## 2023-11-02 PROCEDURE — 258N000003 HC RX IP 258 OP 636: Performed by: SURGERY

## 2023-11-02 PROCEDURE — 250N000011 HC RX IP 250 OP 636: Performed by: SURGERY

## 2023-11-02 PROCEDURE — 250N000013 HC RX MED GY IP 250 OP 250 PS 637: Performed by: STUDENT IN AN ORGANIZED HEALTH CARE EDUCATION/TRAINING PROGRAM

## 2023-11-02 PROCEDURE — 71045 X-RAY EXAM CHEST 1 VIEW: CPT

## 2023-11-02 PROCEDURE — 120N000002 HC R&B MED SURG/OB UMMC

## 2023-11-02 PROCEDURE — 36415 COLL VENOUS BLD VENIPUNCTURE: CPT

## 2023-11-02 PROCEDURE — 97530 THERAPEUTIC ACTIVITIES: CPT | Mod: GP

## 2023-11-02 PROCEDURE — 250N000013 HC RX MED GY IP 250 OP 250 PS 637: Performed by: SURGERY

## 2023-11-02 RX ORDER — MAGNESIUM OXIDE 400 MG/1
400 TABLET ORAL EVERY 4 HOURS
Status: COMPLETED | OUTPATIENT
Start: 2023-11-02 | End: 2023-11-02

## 2023-11-02 RX ORDER — AMLODIPINE BESYLATE 10 MG/1
10 TABLET ORAL EVERY MORNING
Status: DISCONTINUED | OUTPATIENT
Start: 2023-11-03 | End: 2023-11-04 | Stop reason: HOSPADM

## 2023-11-02 RX ADMIN — OXYCODONE HYDROCHLORIDE 5 MG: 5 TABLET ORAL at 09:27

## 2023-11-02 RX ADMIN — SODIUM CHLORIDE, POTASSIUM CHLORIDE, SODIUM LACTATE AND CALCIUM CHLORIDE: 600; 310; 30; 20 INJECTION, SOLUTION INTRAVENOUS at 01:40

## 2023-11-02 RX ADMIN — OXYCODONE HYDROCHLORIDE 5 MG: 5 TABLET ORAL at 16:18

## 2023-11-02 RX ADMIN — ACETAMINOPHEN 975 MG: 325 TABLET, FILM COATED ORAL at 13:37

## 2023-11-02 RX ADMIN — HEPARIN SODIUM 5000 UNITS: 5000 INJECTION, SOLUTION INTRAVENOUS; SUBCUTANEOUS at 16:18

## 2023-11-02 RX ADMIN — ACETAMINOPHEN 975 MG: 325 TABLET, FILM COATED ORAL at 03:26

## 2023-11-02 RX ADMIN — OXYCODONE HYDROCHLORIDE 5 MG: 5 TABLET ORAL at 01:08

## 2023-11-02 RX ADMIN — MAGNESIUM OXIDE TAB 400 MG (241.3 MG ELEMENTAL MG) 400 MG: 400 (241.3 MG) TAB at 19:42

## 2023-11-02 RX ADMIN — SODIUM CHLORIDE, POTASSIUM CHLORIDE, SODIUM LACTATE AND CALCIUM CHLORIDE: 600; 310; 30; 20 INJECTION, SOLUTION INTRAVENOUS at 22:04

## 2023-11-02 RX ADMIN — HEPARIN SODIUM 5000 UNITS: 5000 INJECTION, SOLUTION INTRAVENOUS; SUBCUTANEOUS at 23:50

## 2023-11-02 RX ADMIN — METOPROLOL TARTRATE 5 MG: 5 INJECTION INTRAVENOUS at 19:42

## 2023-11-02 RX ADMIN — SENNOSIDES AND DOCUSATE SODIUM 1 TABLET: 50; 8.6 TABLET ORAL at 19:42

## 2023-11-02 RX ADMIN — METOPROLOL TARTRATE 5 MG: 5 INJECTION INTRAVENOUS at 13:43

## 2023-11-02 RX ADMIN — ACETAMINOPHEN 975 MG: 325 TABLET, FILM COATED ORAL at 19:42

## 2023-11-02 RX ADMIN — MAGNESIUM OXIDE TAB 400 MG (241.3 MG ELEMENTAL MG) 400 MG: 400 (241.3 MG) TAB at 22:02

## 2023-11-02 RX ADMIN — AMLODIPINE BESYLATE 5 MG: 5 TABLET ORAL at 09:27

## 2023-11-02 RX ADMIN — PANTOPRAZOLE SODIUM 40 MG: 40 TABLET, DELAYED RELEASE ORAL at 09:27

## 2023-11-02 ASSESSMENT — ACTIVITIES OF DAILY LIVING (ADL)
ADLS_ACUITY_SCORE: 31
ADLS_ACUITY_SCORE: 30
ADLS_ACUITY_SCORE: 31
ADLS_ACUITY_SCORE: 30
ADLS_ACUITY_SCORE: 31
ADLS_ACUITY_SCORE: 31
ADLS_ACUITY_SCORE: 30

## 2023-11-02 NOTE — PHARMACY-ADMISSION MEDICATION HISTORY
Pharmacist Admission Medication History    Admission medication history is complete. The information provided in this note is only as accurate as the sources available at the time of the update.    Information Source(s): Patient and CareEverywhere/SureScripts via in-person    Pertinent Information:   - Patient was a reliable historian able to confirm all medication names, strengths, frequencies, and last doses.   - Patient reports that amlodipine dose was increased from 5 mg daily to 10 mg daily one week prior to admission due to elevated blood pressure at office visit.     Changes made to PTA medication list:  Added: None  Deleted: None  Changed:   Amlodipine changed from 5 mg to 10 mg daily  Saline nasal spray changed to nightly as needed.     Allergies reviewed with patient and updates made in EHR: yes    Medication History Completed By: Miguel Manzano [facility] 11/2/2023 9:40 AM    Prior to Admission medications    Medication Sig Last Dose Taking? Auth Provider Long Term End Date   acetaminophen (TYLENOL) 650 MG CR tablet Take 650 mg by mouth 3 times daily as needed for pain 10/31/2023 Yes Reported, Patient     alendronate (FOSAMAX) 70 MG tablet Take 70 mg by mouth every 7 days SATURDAY'S 10/29/2023 at 0800 Yes Reported, Patient Yes    amLODIPine (NORVASC) 5 MG tablet Take 10 mg by mouth every morning 11/1/2023 at 0800 Yes Reported, Patient Yes    atorvastatin (LIPITOR) 40 MG tablet Take 1 tablet by mouth at bedtime 10/25/2023 at 2000 Yes Reported, Patient Yes    Ferrous Sulfate (IRON) 325 (65 Fe) MG tablet Take 325 mg by mouth every 48 hours 10/26/2023 at 2000 Yes Reported, Patient     lisinopril (ZESTRIL) 20 MG tablet Take 1 tablet by mouth at bedtime 11/1/2023 at 2000 Yes Reported, Patient Yes    Melatonin 10 MG CAPS Take 10 mg by mouth nightly as needed (sleep) More than a month Yes Reported, Patient     metoprolol succinate ER (TOPROL XL) 200 MG 24 hr tablet Take 1 tablet by mouth at bedtime 10/25/2023 at 2000 Yes  Reported, Patient Yes    multivitamin w/minerals (MULTI-VITAMIN) tablet Take 1 tablet by mouth at bedtime 10/25/2023 at 2000 Yes Reported, Patient     omeprazole (PRILOSEC) 40 MG DR capsule Take 40 mg by mouth daily at 4pm 10/31/2023 at 1600 Yes Reported, Patient     SALINE NASAL SPRAY NA Apply 1 spray into each nare nightly as needed (Congestion) Past Week Yes Reported, Patient     triamterene-HCTZ (MAXZIDE-25) 37.5-25 MG tablet Take 1 tablet by mouth as needed (Lower extremity edema) More than a month Yes Reported, Patient Yes

## 2023-11-02 NOTE — PLAN OF CARE
"Goal Outcome Evaluation:    Plan of Care reviewed with: patient       Overall patient progress: no change  BP (!) 156/84 (BP Location: Right arm)   Pulse 70   Temp 97.7  F (36.5  C) (Oral)   Resp 17   Ht 1.6 m (5' 3\")   Wt 61.3 kg (135 lb 2.3 oz)   SpO2 97%   BMI 23.94 kg/m      PT noted to be comfortable on this shift, VSS, but did c/o mild SOB, is on 2L via NC, sating high in the 90s, no resp distress noted. Also pt c/o pain at incision site PRN po oxycodone and schedule tylenol administer. PT has left chest tube to water seal with minimal serosanguinous drainage.Voided x2 on this shift, no acute events noted.                  "

## 2023-11-02 NOTE — PLAN OF CARE
"Goal Outcome Evaluation:      Plan of Care Reviewed With: patient    Overall Patient Progress: no changeOverall Patient Progress: no change     BP (!) 152/81 (BP Location: Right arm)   Pulse 70   Temp 97.7  F (36.5  C) (Oral)   Resp 14   Ht 1.6 m (5' 3\")   Wt 61.3 kg (135 lb 2.3 oz)   SpO2 97%   BMI 23.94 kg/m      Admitted/transferred from: PACU.  2 RN full   skin assessment completed by Iliana Hurst RN and MOSES Walker.  Skin assessment finding: issues found 5 Lap sites, 1 CT, 1 GT, cracked between butt crease.      Interventions/actions: skin interventions monitor.      Bedside Emergency Equipment Present:  Suction Regulator: Yes  Suction Canister: Yes  Tubing between Regulator and Canister: Yes  O2 Regulator with Tree: Yes  Ambu Bag: Yes.       "

## 2023-11-02 NOTE — PROGRESS NOTES
THORACIC & FOREGUT SURGERY    S:  No acute overnight events. Patient resting comfortably at bed, large amount of subcutaneous air on exam. Denies any shortness of breath or chest pain, making adequate urine.         O:  Vitals:    11/01/23 2205 11/01/23 2305 11/02/23 0246 11/02/23 0716   BP: (!) 143/79 (!) 158/78 (!) 156/84 131/68   BP Location: Right arm Right arm Right arm    Cuff Size:       Pulse: 71 77 70 64   Resp: 16 18 17 17   Temp:  97.7  F (36.5  C) 97.7  F (36.5  C) 98.1  F (36.7  C)   TempSrc:  Oral Oral Oral   SpO2: 96% 94% 97% 98%   Weight:       Height:         Gen: A&O x3, NAD, large subcutaneous emphysema present  Resp: breathing non-labored on 2L, left chest tube to water seal  CV: RRR, hemodynamically stable  Abdomen: soft, non-tender, non-distended, tympanic  Extremities: Distal extremities warm, no calf tenderness  Devices: left chest tube to water seal with minimal serosanguinous drainage    A/P: Sonja Macias is a 83 year old female POD#1 s/p robot-assisted laparoscopic hiatal hernia repair with esophagogastroscopy. Patient doing well post-operatively, has large amount of subcutaneous emphysema from surgery which she is largely asymptomatic from. Tolerating sips and chips yesterday, will plan to advance to clear liquid diet today, encourage up out of bed and ambulation today.     - Neuro/Pain: Tylenol, Oxycodone, Dilaudid  - CV: Home Amlodipine, Metoprolol  - Resp: Duoneb PRN   - GI: CLD, bowel regimen  - PPX: SQH, PPI    Renan Sethi MD   PGY-1 Surgery

## 2023-11-02 NOTE — PROGRESS NOTES
11/02/23 1000   Appointment Info   Signing Clinician's Name / Credentials (PT) Ximena Sena, PT, DPT   Living Environment   People in Home child(tiburcio), adult   Current Living Arrangements house   Home Accessibility stairs to enter home;stairs within home   Number of Stairs, Main Entrance 2   Stair Railings, Main Entrance none   Number of Stairs, Within Home, Primary greater than 10 stairs   Stair Railings, Within Home, Primary railings on both sides of stairs   Transportation Anticipated family or friend will provide   Living Environment Comments pt lives in a 3 level home with 2 steps to enter and reports has 13 steps to go down to the basement where her bedroom is.   Self-Care   Usual Activity Tolerance good   Current Activity Tolerance fair   Regular Exercise No   Equipment Currently Used at Home none   Fall history within last six months yes   Number of times patient has fallen within last six months 1   Activity/Exercise/Self-Care Comment IND at baseline with all mobility   General Information   Onset of Illness/Injury or Date of Surgery 11/01/23   Referring Physician Skyla Dobbs MD   Patient/Family Therapy Goals Statement (PT) to go home   Pertinent History of Current Problem (include personal factors and/or comorbidities that impact the POC) per EMR:Sonja Macias is a 83 year old female POD#1 s/p robot-assisted laparoscopic hiatal hernia repair with esophagogastroscopy. Patient doing well post-operatively, has large amount of subcutaneous emphysema from surgery which she is largely asymptomatic from.   Existing Precautions/Restrictions abdominal   Cognition   Affect/Mental Status (Cognition) WNL   Orientation Status (Cognition) oriented x 4   Follows Commands (Cognition) WNL   Pain Assessment   Patient Currently in Pain Yes, see Vital Sign flowsheet   Integumentary/Edema   Integumentary/Edema no deficits were identifed   Posture    Posture Forward head position   Range of Motion (ROM)   Range of  Motion ROM is WFL   Strength (Manual Muscle Testing)   Strength (Manual Muscle Testing) Deficits observed during functional mobility   Bed Mobility   Bed Mobility supine-sit   Comment, (Bed Mobility) SBA with supine to sit   Transfers   Transfers sit-stand transfer   Comment, (Transfers) SBA with sit to stand   Gait/Stairs (Locomotion)   [place] Level (Gait) verbal cues;supervision   Assistive Device (Gait) other (see comments)  (IV pole support)   Comment, (Gait/Stairs) CGA-SBA with short gait in room   Balance   Balance no deficits were identified   Sensory Examination   Sensory Perception patient reports no sensory changes   Coordination   Coordination no deficits were identified   Muscle Tone   Muscle Tone no deficits were identified   Clinical Impression   Criteria for Skilled Therapeutic Intervention Yes, treatment indicated   PT Diagnosis (PT) impaired functional mobility   Influenced by the following impairments weakness, pain, activity tolerance   Functional limitations due to impairments bed mobility, transfers, gait, stairs   Clinical Presentation (PT Evaluation Complexity) stable   Clinical Presentation Rationale clinician judgement and pt presentation   Clinical Decision Making (Complexity) low complexity   Planned Therapy Interventions (PT) balance training;bed mobility training;gait training;home exercise program;patient/family education;stair training;strengthening;transfer training;progressive activity/exercise;risk factor education;home program guidelines   Risk & Benefits of therapy have been explained evaluation/treatment results reviewed;care plan/treatment goals reviewed;risks/benefits reviewed;current/potential barriers reviewed;participants voiced agreement with care plan;participants included;patient   PT Total Evaluation Time   PT Eval, Low Complexity Minutes (24227) 8   Physical Therapy Goals   PT Frequency 5x/week   PT Predicted Duration/Target Date for Goal Attainment 11/15/23   PT  Goals Bed Mobility;Transfers;Gait;Stairs   PT: Bed Mobility Independent;Within precautions   PT: Transfers Independent;Sit to/from stand;Within precautions   PT: Gait Independent;Within precautions;Greater than 200 feet   PT: Stairs Modified independent;Greater than 10 stairs;Rail on both sides   Interventions   Interventions Quick Adds Therapeutic Activity   Therapeutic Activity   Therapeutic Activities: dynamic activities to improve functional performance Minutes (57496) 25   Symptoms Noted During/After Treatment Fatigue   Treatment Detail/Skilled Intervention PT: pt greeted supine  in bed; declines OOR mobility 2/2 increased pain. On 3L O2 via NC initially but decreased to 1L O2 throughout session and sats >93%. Encouraged to stay at 1L and wean off. post subjective evaluation; educated on abd precautions, worked on bed mobility with log rolling technique. SBA with mobility and stood up with cueing to push through legs. Emphasized importance of continued mobility to promote activity tolerance and reduce pain. Pt verbalized agreement but reporting pain this date is too great for her to mobilize. Set goals and plan to promote mobility next session. Provided handout for abd precautions. Ensured safely transferred back to supine in bed after short gait in room. Pt also with chest tube on continuous suction.   PT Discharge Planning   PT Plan PT: progress gait with LRAD, functional strengthening, flat bed mobility   PT Discharge Recommendation (DC Rec) home with assist   PT Rationale for DC Rec Pt currently below baseline 2/2 pain but anticipate will progress to safely d.c home. PT will continue to follow and determine d/c needs and update recs appropriately.   PT Brief overview of current status SBA   Total Session Time   Timed Code Treatment Minutes 25   Total Session Time (sum of timed and untimed services) 33

## 2023-11-02 NOTE — PLAN OF CARE
Goal Outcome Evaluation:      Plan of Care Reviewed With: patient    Overall Patient Progress: no changeOverall Patient Progress: no change    Outcome Evaluation: Px alert and oriented x4, able to make needs known. c/o pain on surgical site on left rated 8/10 on pain  scale. PRN oxycodone given and was effective, slept afterwards. Ambulated to the bathroom with therapy, using walker and gaitbelt. Continue with POC.  Chest tube site leaking, dressing changed. Agreeable with taking metoprolol this afternoon, when daughter is here.

## 2023-11-03 ENCOUNTER — APPOINTMENT (OUTPATIENT)
Dept: OCCUPATIONAL THERAPY | Facility: CLINIC | Age: 83
DRG: 328 | End: 2023-11-03
Attending: STUDENT IN AN ORGANIZED HEALTH CARE EDUCATION/TRAINING PROGRAM
Payer: MEDICARE

## 2023-11-03 ENCOUNTER — APPOINTMENT (OUTPATIENT)
Dept: PHYSICAL THERAPY | Facility: CLINIC | Age: 83
DRG: 328 | End: 2023-11-03
Attending: STUDENT IN AN ORGANIZED HEALTH CARE EDUCATION/TRAINING PROGRAM
Payer: MEDICARE

## 2023-11-03 ENCOUNTER — APPOINTMENT (OUTPATIENT)
Dept: GENERAL RADIOLOGY | Facility: CLINIC | Age: 83
DRG: 328 | End: 2023-11-03
Attending: STUDENT IN AN ORGANIZED HEALTH CARE EDUCATION/TRAINING PROGRAM
Payer: MEDICARE

## 2023-11-03 LAB
ANION GAP SERPL CALCULATED.3IONS-SCNC: 11 MMOL/L (ref 7–15)
BUN SERPL-MCNC: 10.3 MG/DL (ref 8–23)
CALCIUM SERPL-MCNC: 8.7 MG/DL (ref 8.8–10.2)
CHLORIDE SERPL-SCNC: 103 MMOL/L (ref 98–107)
CREAT SERPL-MCNC: 0.47 MG/DL (ref 0.51–0.95)
DEPRECATED HCO3 PLAS-SCNC: 23 MMOL/L (ref 22–29)
EGFRCR SERPLBLD CKD-EPI 2021: >90 ML/MIN/1.73M2
ERYTHROCYTE [DISTWIDTH] IN BLOOD BY AUTOMATED COUNT: 14 % (ref 10–15)
GLUCOSE SERPL-MCNC: 109 MG/DL (ref 70–99)
HCT VFR BLD AUTO: 44.9 % (ref 35–47)
HGB BLD-MCNC: 14.3 G/DL (ref 11.7–15.7)
MAGNESIUM SERPL-MCNC: 1.8 MG/DL (ref 1.7–2.3)
MCH RBC QN AUTO: 27.8 PG (ref 26.5–33)
MCHC RBC AUTO-ENTMCNC: 31.8 G/DL (ref 31.5–36.5)
MCV RBC AUTO: 87 FL (ref 78–100)
PATH REPORT.COMMENTS IMP SPEC: NORMAL
PATH REPORT.COMMENTS IMP SPEC: NORMAL
PATH REPORT.FINAL DX SPEC: NORMAL
PATH REPORT.GROSS SPEC: NORMAL
PATH REPORT.MICROSCOPIC SPEC OTHER STN: NORMAL
PATH REPORT.RELEVANT HX SPEC: NORMAL
PHOSPHATE SERPL-MCNC: 1.8 MG/DL (ref 2.5–4.5)
PHOTO IMAGE: NORMAL
PLATELET # BLD AUTO: 231 10E3/UL (ref 150–450)
POTASSIUM SERPL-SCNC: 3.7 MMOL/L (ref 3.4–5.3)
RBC # BLD AUTO: 5.14 10E6/UL (ref 3.8–5.2)
SODIUM SERPL-SCNC: 137 MMOL/L (ref 135–145)
WBC # BLD AUTO: 7.2 10E3/UL (ref 4–11)

## 2023-11-03 PROCEDURE — 97535 SELF CARE MNGMENT TRAINING: CPT | Mod: GO

## 2023-11-03 PROCEDURE — 250N000009 HC RX 250: Performed by: SURGERY

## 2023-11-03 PROCEDURE — 250N000013 HC RX MED GY IP 250 OP 250 PS 637

## 2023-11-03 PROCEDURE — 120N000002 HC R&B MED SURG/OB UMMC

## 2023-11-03 PROCEDURE — 97116 GAIT TRAINING THERAPY: CPT | Mod: GP

## 2023-11-03 PROCEDURE — 71045 X-RAY EXAM CHEST 1 VIEW: CPT

## 2023-11-03 PROCEDURE — 250N000013 HC RX MED GY IP 250 OP 250 PS 637: Performed by: SURGERY

## 2023-11-03 PROCEDURE — 97530 THERAPEUTIC ACTIVITIES: CPT | Mod: GP

## 2023-11-03 PROCEDURE — 250N000013 HC RX MED GY IP 250 OP 250 PS 637: Performed by: STUDENT IN AN ORGANIZED HEALTH CARE EDUCATION/TRAINING PROGRAM

## 2023-11-03 PROCEDURE — C9113 INJ PANTOPRAZOLE SODIUM, VIA: HCPCS | Mod: JZ | Performed by: SURGERY

## 2023-11-03 PROCEDURE — 36415 COLL VENOUS BLD VENIPUNCTURE: CPT

## 2023-11-03 PROCEDURE — 97530 THERAPEUTIC ACTIVITIES: CPT | Mod: GO

## 2023-11-03 PROCEDURE — 84100 ASSAY OF PHOSPHORUS: CPT

## 2023-11-03 PROCEDURE — 71045 X-RAY EXAM CHEST 1 VIEW: CPT | Mod: 26 | Performed by: RADIOLOGY

## 2023-11-03 PROCEDURE — 250N000011 HC RX IP 250 OP 636: Mod: JZ | Performed by: SURGERY

## 2023-11-03 PROCEDURE — 83735 ASSAY OF MAGNESIUM: CPT

## 2023-11-03 PROCEDURE — 258N000003 HC RX IP 258 OP 636: Performed by: SURGERY

## 2023-11-03 PROCEDURE — 80048 BASIC METABOLIC PNL TOTAL CA: CPT

## 2023-11-03 PROCEDURE — 85027 COMPLETE CBC AUTOMATED: CPT

## 2023-11-03 RX ORDER — MAGNESIUM OXIDE 400 MG/1
400 TABLET ORAL EVERY 4 HOURS
Status: COMPLETED | OUTPATIENT
Start: 2023-11-03 | End: 2023-11-03

## 2023-11-03 RX ADMIN — ACETAMINOPHEN 975 MG: 325 TABLET, FILM COATED ORAL at 20:13

## 2023-11-03 RX ADMIN — OXYCODONE HYDROCHLORIDE 5 MG: 5 TABLET ORAL at 06:44

## 2023-11-03 RX ADMIN — METOPROLOL TARTRATE 5 MG: 5 INJECTION INTRAVENOUS at 20:12

## 2023-11-03 RX ADMIN — SODIUM CHLORIDE, POTASSIUM CHLORIDE, SODIUM LACTATE AND CALCIUM CHLORIDE: 600; 310; 30; 20 INJECTION, SOLUTION INTRAVENOUS at 19:43

## 2023-11-03 RX ADMIN — MAGNESIUM OXIDE TAB 400 MG (241.3 MG ELEMENTAL MG) 400 MG: 400 (241.3 MG) TAB at 20:13

## 2023-11-03 RX ADMIN — MAGNESIUM OXIDE TAB 400 MG (241.3 MG ELEMENTAL MG) 400 MG: 400 (241.3 MG) TAB at 17:00

## 2023-11-03 RX ADMIN — OXYCODONE HYDROCHLORIDE 5 MG: 5 TABLET ORAL at 20:13

## 2023-11-03 RX ADMIN — METOPROLOL TARTRATE 5 MG: 5 INJECTION INTRAVENOUS at 08:40

## 2023-11-03 RX ADMIN — SENNOSIDES AND DOCUSATE SODIUM 1 TABLET: 50; 8.6 TABLET ORAL at 20:13

## 2023-11-03 RX ADMIN — PANTOPRAZOLE SODIUM 40 MG: 40 INJECTION, POWDER, FOR SOLUTION INTRAVENOUS at 08:40

## 2023-11-03 RX ADMIN — HEPARIN SODIUM 5000 UNITS: 5000 INJECTION, SOLUTION INTRAVENOUS; SUBCUTANEOUS at 17:00

## 2023-11-03 RX ADMIN — ACETAMINOPHEN 975 MG: 325 TABLET, FILM COATED ORAL at 13:57

## 2023-11-03 RX ADMIN — METOPROLOL TARTRATE 5 MG: 5 INJECTION INTRAVENOUS at 02:38

## 2023-11-03 RX ADMIN — AMLODIPINE BESYLATE 10 MG: 10 TABLET ORAL at 08:37

## 2023-11-03 RX ADMIN — HEPARIN SODIUM 5000 UNITS: 5000 INJECTION, SOLUTION INTRAVENOUS; SUBCUTANEOUS at 08:39

## 2023-11-03 RX ADMIN — METOPROLOL TARTRATE 5 MG: 5 INJECTION INTRAVENOUS at 14:19

## 2023-11-03 ASSESSMENT — ACTIVITIES OF DAILY LIVING (ADL)
ADLS_ACUITY_SCORE: 30

## 2023-11-03 NOTE — PLAN OF CARE
Goal Outcome Evaluation:      Plan of Care Reviewed With: patient    Overall Patient Progress: no change    A&Ox4, 2 L NC. Periorbital & facial swelling 3+.  G tube to gravity. Pain managed w/ PRN oxycodone. CT to WS. Meds taken in applesauce. SBA to bathroom. Pt attempted to get out of bed several times w/o calling for help-bed alarm placed, educated on importance of calling for help to prevent falls. Continue POC.

## 2023-11-03 NOTE — PROGRESS NOTES
THORACIC & FOREGUT SURGERY    S:  No acute overnight events.  Feeling well.    O:  Vitals:    11/02/23 2304 11/03/23 0716 11/03/23 0843 11/03/23 0932   BP: (!) 161/84 (!) 163/87 (!) 170/99 139/82   BP Location: Left arm Left arm  Left arm   Pulse: 79 84 91 84   Resp: 18 18     Temp: 97.5  F (36.4  C) 97.7  F (36.5  C)     TempSrc: Oral Oral     SpO2: 94% 94%     Weight:       Height:         Gen: A&O, NAD, large subcutaneous emphysema present, improving vs yesterday  Resp: breathing non-labored   CV: RRR, hemodynamically stable  Abdomen: soft, non-tender, non-distended, tympanic  Extremities: Distal extremities warm, no calf tenderness  Devices: left chest tube to water seal with minimal serosanguinous drainage    A/P: Sonja Macias is a 83 year old female POD#2 s/p robot-assisted laparoscopic hiatal hernia repair with esophagogastroscopy. Patient doing well post-operatively. Tolerating sips and chips yesterday, will plan to advance to clear liquid diet today, encourage up out of bed and ambulation today.     -Full liquid diet  -Clamp G tube, vent PRN  -PO pain control  -Likely dispo tomorrow if doing well    Cole Man PA-C

## 2023-11-03 NOTE — PLAN OF CARE
Plan of Care Reviewed With: patient    Overall Patient Progress: improving    Outcome Evaluation: Patient tolerated G tube clamping today. Denies nausea. Ambulated in hallway with therapy. Chest tube removed this am. Pain 3/10.

## 2023-11-03 NOTE — PLAN OF CARE
"BP (!) 161/84 (BP Location: Left arm)   Pulse 79   Temp 97.5  F (36.4  C) (Oral)   Resp 18   Ht 1.6 m (5' 3\")   Wt 61.3 kg (135 lb 2.3 oz)   SpO2 94%   BMI 23.94 kg/m      Status: S/p hernia repair/G tube placement  Activity: SBA  Neuros: A&Ox4, no deficits noted.  Cardiac: WDL, denies chest pain.  Respiratory: WDL on 2L NC, denies SOB.  GI/: +BS, no BM yet since surgery. Voids spont with AUOP.  Diet: Tolerating clears.  Skin/Incisions: WDL ex facial/periorbital swelling.  Lines/Drains: G tube to gravity, L CT to ws, no air leak; R PIV running LR at 50.  Pain/Nausea: Denies.  New Changes: No acute changes this shift. Swelling is about the same, maybe slightly improved since 11pm at start of shift.    "

## 2023-11-03 NOTE — OP NOTE
Procedure Date: 11/1/23     PREOPERATIVE DIAGNOSES:  Hiatal hernia    POSTOPERATIVE DIAGNOSES:  Hiatal hernia    PROCEDURES PERFORMED:  Robotic hiatal hernia repair , EGD    and gastrostomy tube     OPERATIVE FINDINGS: large hiatal hernia.     DESCRIPTION OF PROCEDURE:  After obtaining informed consent, the patient was brought to the operating room and laid supine on the operating table.  After induction of general anesthesia and introduction of an endotracheal tube, the patient was positioned supine with  arms out and a footboard.  We then proceeded to enter the peritoneal cavity using the Veres needle and a visiport.  We had 4 robotic ports in the supraumbilical region, 1 assistant port in the left infraumbilical region and we had a 5 mm port in the right lateral subcostal area for the liver retractor.  The robot was then docked.  We proceeded to take down some adhesions.  The gastrohepatic ligament was then taken down, followed by the short gastric arteries.  The hiatus was circumferentially dissected.  The esophagus was mobilized into the mediastinum circumferentially.  An endoscopy performed at this point confirmed adequate intraabdominal length of esophagus up to 3 cm.  The gastric fat pad was taken down.  The vagus nerve was identified.  We then performed a cruroplasty with pledgeted 0 silk sutures on the posterior crura. This was done over with a 54 esophageal bougie in place.  We ensured that a grasper could freely be passed after the repair had been completed around it.  .  After this, an endoscopy was performed.  We then proceeded to insufflate the stomach and we identified an easily transilluminated spot in the left upper quadrant.  Using the Seldinger technique a wire was threaded into the stomach and a 20 Hungarian Ponsky gastrostomy tube was delivered endoscopically into the stomach to emerge at the left upper quadrant incision site.  This was secured to the skin.  After this, hemostasis was ensured.   The port sites were closed in layers.   Fascial closure was performed for the first port that we had used the open technique on. AThe patient tolerated the procedure well.

## 2023-11-03 NOTE — PROVIDER NOTIFICATION
7B 19 Snow  periorbital & facial swelling has increased and pt's voice sounds more nasally. denies any SOB or throat swelling, please advise  thanks, Catia LOPES [phone]

## 2023-11-04 VITALS
RESPIRATION RATE: 16 BRPM | HEART RATE: 97 BPM | TEMPERATURE: 97.8 F | DIASTOLIC BLOOD PRESSURE: 97 MMHG | HEIGHT: 63 IN | SYSTOLIC BLOOD PRESSURE: 158 MMHG | OXYGEN SATURATION: 93 % | BODY MASS INDEX: 23.95 KG/M2 | WEIGHT: 135.14 LBS

## 2023-11-04 LAB
ANION GAP SERPL CALCULATED.3IONS-SCNC: 8 MMOL/L (ref 7–15)
BUN SERPL-MCNC: 8.8 MG/DL (ref 8–23)
CALCIUM SERPL-MCNC: 9.1 MG/DL (ref 8.8–10.2)
CHLORIDE SERPL-SCNC: 106 MMOL/L (ref 98–107)
CREAT SERPL-MCNC: 0.49 MG/DL (ref 0.51–0.95)
DEPRECATED HCO3 PLAS-SCNC: 23 MMOL/L (ref 22–29)
EGFRCR SERPLBLD CKD-EPI 2021: >90 ML/MIN/1.73M2
ERYTHROCYTE [DISTWIDTH] IN BLOOD BY AUTOMATED COUNT: 14.1 % (ref 10–15)
GLUCOSE SERPL-MCNC: 100 MG/DL (ref 70–99)
HCT VFR BLD AUTO: 42.1 % (ref 35–47)
HGB BLD-MCNC: 13.8 G/DL (ref 11.7–15.7)
MAGNESIUM SERPL-MCNC: 2 MG/DL (ref 1.7–2.3)
MCH RBC QN AUTO: 27.8 PG (ref 26.5–33)
MCHC RBC AUTO-ENTMCNC: 32.8 G/DL (ref 31.5–36.5)
MCV RBC AUTO: 85 FL (ref 78–100)
PHOSPHATE SERPL-MCNC: 1.7 MG/DL (ref 2.5–4.5)
PLATELET # BLD AUTO: 219 10E3/UL (ref 150–450)
POTASSIUM SERPL-SCNC: 3.4 MMOL/L (ref 3.4–5.3)
RBC # BLD AUTO: 4.97 10E6/UL (ref 3.8–5.2)
SODIUM SERPL-SCNC: 137 MMOL/L (ref 135–145)
WBC # BLD AUTO: 5.2 10E3/UL (ref 4–11)

## 2023-11-04 PROCEDURE — 85027 COMPLETE CBC AUTOMATED: CPT

## 2023-11-04 PROCEDURE — 83735 ASSAY OF MAGNESIUM: CPT

## 2023-11-04 PROCEDURE — 36415 COLL VENOUS BLD VENIPUNCTURE: CPT

## 2023-11-04 PROCEDURE — 250N000009 HC RX 250: Performed by: STUDENT IN AN ORGANIZED HEALTH CARE EDUCATION/TRAINING PROGRAM

## 2023-11-04 PROCEDURE — 250N000013 HC RX MED GY IP 250 OP 250 PS 637

## 2023-11-04 PROCEDURE — 250N000013 HC RX MED GY IP 250 OP 250 PS 637: Performed by: SURGERY

## 2023-11-04 PROCEDURE — 80048 BASIC METABOLIC PNL TOTAL CA: CPT

## 2023-11-04 PROCEDURE — 84100 ASSAY OF PHOSPHORUS: CPT

## 2023-11-04 PROCEDURE — 250N000009 HC RX 250: Performed by: SURGERY

## 2023-11-04 PROCEDURE — 250N000011 HC RX IP 250 OP 636: Performed by: SURGERY

## 2023-11-04 RX ORDER — OXYCODONE HYDROCHLORIDE 5 MG/1
5 TABLET ORAL EVERY 6 HOURS PRN
Qty: 40 TABLET | Refills: 0 | Status: CANCELLED | OUTPATIENT
Start: 2023-11-04

## 2023-11-04 RX ORDER — METOPROLOL SUCCINATE 100 MG/1
100 TABLET, EXTENDED RELEASE ORAL AT BEDTIME
Status: DISCONTINUED | OUTPATIENT
Start: 2023-11-04 | End: 2023-11-04 | Stop reason: HOSPADM

## 2023-11-04 RX ORDER — POLYETHYLENE GLYCOL 3350 17 G/17G
17 POWDER, FOR SOLUTION ORAL DAILY
Qty: 510 G | Refills: 0 | Status: SHIPPED | OUTPATIENT
Start: 2023-11-04 | End: 2023-11-06

## 2023-11-04 RX ORDER — PANTOPRAZOLE SODIUM 40 MG/1
40 TABLET, DELAYED RELEASE ORAL DAILY
Qty: 30 TABLET | Refills: 0 | Status: SHIPPED | OUTPATIENT
Start: 2023-11-05 | End: 2023-11-06

## 2023-11-04 RX ORDER — ONDANSETRON 4 MG/1
4 TABLET, ORALLY DISINTEGRATING ORAL EVERY 6 HOURS PRN
Qty: 20 TABLET | Refills: 0 | Status: SHIPPED | OUTPATIENT
Start: 2023-11-04 | End: 2023-11-06

## 2023-11-04 RX ORDER — OXYCODONE HYDROCHLORIDE 5 MG/1
5 TABLET ORAL EVERY 4 HOURS PRN
Qty: 40 TABLET | Refills: 0 | Status: SHIPPED | OUTPATIENT
Start: 2023-11-04 | End: 2023-11-06

## 2023-11-04 RX ORDER — METOPROLOL TARTRATE 1 MG/ML
5 INJECTION, SOLUTION INTRAVENOUS ONCE
Status: COMPLETED | OUTPATIENT
Start: 2023-11-04 | End: 2023-11-04

## 2023-11-04 RX ADMIN — METOPROLOL TARTRATE 5 MG: 5 INJECTION INTRAVENOUS at 02:51

## 2023-11-04 RX ADMIN — ACETAMINOPHEN 650 MG: 325 TABLET, FILM COATED ORAL at 14:12

## 2023-11-04 RX ADMIN — HEPARIN SODIUM 5000 UNITS: 5000 INJECTION, SOLUTION INTRAVENOUS; SUBCUTANEOUS at 08:30

## 2023-11-04 RX ADMIN — AMLODIPINE BESYLATE 10 MG: 10 TABLET ORAL at 08:30

## 2023-11-04 RX ADMIN — PANTOPRAZOLE SODIUM 40 MG: 40 TABLET, DELAYED RELEASE ORAL at 08:29

## 2023-11-04 RX ADMIN — METOPROLOL TARTRATE 5 MG: 5 INJECTION INTRAVENOUS at 14:11

## 2023-11-04 RX ADMIN — METOPROLOL TARTRATE 5 MG: 5 INJECTION INTRAVENOUS at 08:30

## 2023-11-04 RX ADMIN — ACETAMINOPHEN 975 MG: 325 TABLET, FILM COATED ORAL at 08:30

## 2023-11-04 ASSESSMENT — ACTIVITIES OF DAILY LIVING (ADL)
ADLS_ACUITY_SCORE: 22
ADLS_ACUITY_SCORE: 30
ADLS_ACUITY_SCORE: 22
ADLS_ACUITY_SCORE: 30
ADLS_ACUITY_SCORE: 22
ADLS_ACUITY_SCORE: 30
ADLS_ACUITY_SCORE: 22
ADLS_ACUITY_SCORE: 22

## 2023-11-04 NOTE — PLAN OF CARE
"BP (!) 141/86 (BP Location: Left arm)   Pulse 82   Temp 97.9  F (36.6  C) (Oral)   Resp 16   Ht 1.6 m (5' 3\")   Wt 61.3 kg (135 lb 2.3 oz)   SpO2 96%   BMI 23.94 kg/m      Status: S/p hernia repair/G tube placement  Activity: SBA  Neuros: A&Ox4, no deficits noted.  Cardiac: WDL, denies chest pain.  Respiratory: WDL on RA, denies SOB.  GI/: +BS, no BM yet since surgery, +flatus. Voids spont with AUOP.  Diet: Tolerating clears, takes pills whole with applesauce.  Skin/Incisions: WDL ex mild 7 improving facial/periorbital swelling.  Lines/Drains: G tube clamped; R PIV running LR at 50.  Pain/Nausea: Denies.  New Changes: No acute changes this shift. Swelling is about the same,but significantly improved over last 24 hours.     "

## 2023-11-04 NOTE — PROGRESS NOTES
THORACIC & FOREGUT SURGERY    S:  No significant overnight events.  Hemodynamically stable, afebrile, oxygenating well on room air, good urinary output.  Tolerating full liquids.  Pain is well controlled.  He is passing flatus but no bowel movement yet since surgery.    O:  Vitals:    11/03/23 2010 11/03/23 2104 11/03/23 2318 11/04/23 0757   BP: (!) 176/92 (!) 141/86 (!) 154/84 (!) 160/90   BP Location: Left arm Left arm  Left arm   Pulse: 100 82 114 93   Resp: 16  16 16   Temp: 97.9  F (36.6  C)  98  F (36.7  C) 97.8  F (36.6  C)   TempSrc: Oral  Oral Oral   SpO2: 96%  98% 93%   Weight:       Height:           A&Ox3, NAD  Breathing non-labored  RRR  Soft, NDNT  Distal extremities warm.      A/P: Sonja Macias is a 83 year old female POD#3 s/p robot-assisted laparoscopic hiatal hernia repair with esophagogastroscopy and placement of a percutaneous endoscopic gastrostomy tube. Patient continues to do well post-operatively. Tolerating full liquid diet.  We will advance her diet to a soft diet today.  We will also provide her with a suppository to assist with bowel movement, and if needed we can also do an enema.  Anticipate dismissal later today.    -Soft diet  -Clamp G tube, vent PRN  -PO pain control  -Likely dismissal today following bowel movement.    Ayaan Read MD

## 2023-11-04 NOTE — PLAN OF CARE
Patient verbalized understanding of After Visit Summary. PIV removed, personal belongings packed, supplies sent with patient. Patient escorted to lobby.

## 2023-11-05 NOTE — DISCHARGE SUMMARY
NAME: Sonja Macias   MRN: [id number]   : 1940     DATE OF ADMISSION: 2023     PRE/POSTOPERATIVE DIAGNOSES: Hiatal hernia    PROCEDURES PERFORMED: Robot-assisted laparoscopic hiatal hernia repair with esophagogastroscopy and placement of percutaneous endoscopic gastrostomy tube    PATHOLOGY RESULTS:   Final Diagnosis   HIATAL HERNIA, REPAIR:  Benign fibroadipose connective tissue, consistent with wall of hernia sac     CULTURE RESULTS: None     INTRAOPERATIVE COMPLICATIONS: None     POSTOPERATIVE COMPLICATIONS: None     DRAINS/TUBES PRESENT AT DISCHARGE: Percutaneous gastrostomy tube    DATE OF DISCHARGE:  23    HOSPITAL COURSE: Sonja Macias is a 83 year old female who on 2023 underwent the above-named procedures. After the procedure she was noted to have a moderate amount of subcutaneous emphysema and was asymptomatic from this. She tolerated the operation well and postoperatively was transferred to the general post-surgical unit. Her left chest tube was removed on post operative day #2 without complication. Throughout her post-operative period her diet was progressed and her subcutaneous emphysema improved. The remainder of her course was essentially uncomplicated. Prior to discharge, her pain was controlled well, she was able to perform ADLs and ambulate independently without difficulty, and had full return of bowel and bladder function. She was progressed to a full liquid/soft diet. On 23, she was discharged to home in stable condition with her PEG tube in place. All questions were answered, discharge instructions explained, and proper follow up coordinated with patient with thoracic surgery department.     DISCHARGE EXAM:   A&O, NAD  Resp non-labored  Abdomen soft, non-tender, PEG clamped  Distal extremities warm  Incisions clean dry and intact      DISCHARGE INSTRUCTIONS:  Discharge Procedure Orders   Reason for your hospital stay   Order Comments: Hiatal hernia repair      Activity   Order Comments: Your activity upon discharge: activity as tolerated     Order Specific Question Answer Comments   Is discharge order? Yes      Discharge Instructions   Order Comments: THORACIC SURGERY DISCHARGE INSTRUCTIONS    DIET: Full liquid/Soft diet    If your plans upon discharge include prolonged periods of sitting (i.e a lengthy car or plane ride), it is highly beneficial to get up and walk at least once per hour to help prevent swelling and blood clots.     You may remove chest tube dressing 48 hours after tube removal and bandage the site at your own discretion thereafter.  Small amounts of leakage are normal for 2-3 days after removal.  Feel free to call with questions.    You may get incision wet 2 days after operation. Do not submerge, soak, or scrub incision or swim until seen in follow-up.    Take incentive spirometer home for continued frequent use    Activity as tolerated, no strenous activity until seen in follow-up, no lifting greater than 20 pounds for the next 4-6 weeks.    Stay hydrated. Take over the counter fiber (metamucil or benefiber) and stool softeners (Miralax, docusate or senna) if becoming constipated.     Call for fever greater than 101.5, chills, increased size of incision, red skin around incision, vision changes, muscle strength changes, sensation changes, shortness of breath, or other concerns.    No driving while taking narcotic pain medication.    Transition to ibuprofen or tylenol/acetaminophen for pain control. Do not take tylenol/acetaminophen and acetaminophen containing narcotic (e.g., percocet or vicodin) at the same time. If you have known ulcer problems, or kidney trouble (elevated creatinine) do not take the ibuprofen.    In emergencies, call 911    For other Questions or Concerns;   A.) During weekday working hours (Monday through Friday 8am to 4:30pm)   call [phone] and ask to speak to a thoracic surgery nurse (RN or LPN).     B.) At nights  "(after 4:30pm), on weekends, or if urgent call [phone] and   tell the  \"I would like to page job code [phone], the thoracic surgery   fellow on call, please.\"      1.) Follow up with primary care physician, Briseida Lyons, in 1-2 weeks.  2.) Follow up with a thoracic surgery Clinical Nurse Specialist in Thoracic Surgery clinic in 3 weeks (11/27 @ 1:00 pm), prior to which an XR Esophagram should be performed.     Diet   Order Comments: Follow this diet upon discharge: Full Liquid/Soft Diet     Order Specific Question Answer Comments   Is discharge order? Yes        DISCHARGE MEDICATIONS:   Discharge Medication List as of 11/4/2023  4:20 PM        START taking these medications    Details   ondansetron (ZOFRAN ODT) 4 MG ODT tab Take 1 tablet (4 mg) by mouth every 6 hours as needed for nausea or vomiting, Disp-20 tablet, R-0, E-Prescribe      oxyCODONE (ROXICODONE) 5 MG tablet Take 1 tablet (5 mg) by mouth every 4 hours as needed for moderate pain, Disp-40 tablet, R-0, Local Print      pantoprazole (PROTONIX) 40 MG EC tablet Take 1 tablet (40 mg) by mouth daily, Disp-30 tablet, R-0, E-Prescribe      polyethylene glycol (MIRALAX) 17 GM/Dose powder Take 17 g by mouth daily, Disp-510 g, R-0, E-Prescribe           CONTINUE these medications which have NOT CHANGED    Details   acetaminophen (TYLENOL) 650 MG CR tablet Take 650 mg by mouth 3 times daily as needed for pain, Historical      alendronate (FOSAMAX) 70 MG tablet Take 70 mg by mouth every 7 days SATURDAY'S, Historical      amLODIPine (NORVASC) 5 MG tablet Take 10 mg by mouth every morning, Historical      atorvastatin (LIPITOR) 40 MG tablet Take 1 tablet by mouth at bedtime, Historical      Ferrous Sulfate (IRON) 325 (65 Fe) MG tablet Take 325 mg by mouth every 48 hours, Historical      lisinopril (ZESTRIL) 20 MG tablet Take 1 tablet by mouth at bedtime, Historical      Melatonin 10 MG CAPS Take 10 mg by mouth nightly as needed (sleep), Historical    "   metoprolol succinate ER (TOPROL XL) 200 MG 24 hr tablet Take 1 tablet by mouth at bedtime, Historical      multivitamin w/minerals (MULTI-VITAMIN) tablet Take 1 tablet by mouth at bedtime, Historical      omeprazole (PRILOSEC) 40 MG DR capsule Take 40 mg by mouth daily at 4pm, Historical      SALINE NASAL SPRAY NA Apply 1 spray into each nare nightly as needed (Congestion), Historical      triamterene-HCTZ (MAXZIDE-25) 37.5-25 MG tablet Take 1 tablet by mouth as needed (Lower extremity edema), Historical

## 2023-11-06 ENCOUNTER — MYC MEDICAL ADVICE (OUTPATIENT)
Dept: SURGERY | Facility: CLINIC | Age: 83
End: 2023-11-06
Payer: MEDICARE

## 2023-11-06 ENCOUNTER — PATIENT OUTREACH (OUTPATIENT)
Dept: SURGERY | Facility: CLINIC | Age: 83
End: 2023-11-06
Payer: MEDICARE

## 2023-11-06 ENCOUNTER — NURSE TRIAGE (OUTPATIENT)
Dept: ONCOLOGY | Facility: CLINIC | Age: 83
End: 2023-11-06
Payer: MEDICARE

## 2023-11-06 DIAGNOSIS — K44.9 HIATAL HERNIA: Primary | ICD-10-CM

## 2023-11-06 DIAGNOSIS — K44.9 HIATAL HERNIA: ICD-10-CM

## 2023-11-06 RX ORDER — OXYCODONE HYDROCHLORIDE 5 MG/1
5 TABLET ORAL EVERY 6 HOURS PRN
Qty: 10 TABLET | Refills: 0 | Status: SHIPPED | OUTPATIENT
Start: 2023-11-06 | End: 2023-11-27

## 2023-11-06 RX ORDER — ONDANSETRON 4 MG/1
4 TABLET, ORALLY DISINTEGRATING ORAL EVERY 6 HOURS PRN
Qty: 20 TABLET | Refills: 0 | Status: SHIPPED | OUTPATIENT
Start: 2023-11-06 | End: 2023-11-27

## 2023-11-06 RX ORDER — PANTOPRAZOLE SODIUM 40 MG/1
40 TABLET, DELAYED RELEASE ORAL DAILY
Qty: 30 TABLET | Refills: 0 | Status: SHIPPED | OUTPATIENT
Start: 2023-11-06 | End: 2023-11-27

## 2023-11-06 RX ORDER — POLYETHYLENE GLYCOL 3350 17 G/17G
17 POWDER, FOR SOLUTION ORAL DAILY
Qty: 510 G | Refills: 0 | Status: SHIPPED | OUTPATIENT
Start: 2023-11-06 | End: 2023-11-27

## 2023-11-06 NOTE — TELEPHONE ENCOUNTER
Was trying to transfer discharge medications to pharmacy in [place] because patient did not  prescriptions at discharge.   Unfortunately The discharge pharmacy discontinued the meds so unable to transfer. Will need new prescriptions sent to [facility] pharmacy [address] [place] [address] [phone]  Looking for the following prescriptions:   Protonix   Miralax   Zofran   Oxycodone      You have received botulinum toxin injections today  The skin around the site of injections should be monitored for a couple of days  If redness or swelling occur, the skin should be examined by a health care professional to rule out infection  You can call my office if this occurs  Please contact our office via [phone] in 2 weeks to report on the effects of the injections or any time with any questions or concerns  Please note that your next injections should not be scheduled less than 90 days from today  If your health insurance changes before the next injections, please contact our office as soon as possible so we can submit for a new prior authorization if needed  Please note that we may not be able to perform the injections if your insurance changes and the treatment is not pre-authorized

## 2023-11-06 NOTE — PROGRESS NOTES
Post hospital discharge call placed to pt, no answer, message left on voicemail with [facility]'s direct callback number, call back requested.    Torie Garcia, RN BSN  Thoracic Surgery RN Care Coordinator  [phone]

## 2023-11-06 NOTE — PROGRESS NOTES
Post Op Discharge Call    Surgery: Robot-assisted laparoscopic hiatal hernia repair with esophagogastroscopy and placement of percutaneous endoscopic gastrostomy tube       Surgery date: 11/1/23    Discharge Date:  11/4/23     Immediate Concerns: None at this time.     Pt mentions facial swelling has gone down. Denies SOB or throat swelling.     Pain:  0/10. Took Tylenol last Friday. No meds for pain since then. Mild discomfort at PEG tube site but per pt this does not bother her.   Pt did not  meds at time of discharge. Will plan to transfer prescriptions to local pharmacy incase she should need them.     Incision:   No concerns, healing well, no redness, drainage or edema reported.  Wound care reviewed. No issues with chest tube site, PEG tube, or surgical incisions.     Drains:   No drain.     Diet:   Discussed advancing diet as tolerated. Pt discharged on liquid soft diet. Progression of soft diet discussed. Pt reported she has tolerated Cream of Wheat well. Drinking water, hot chocolate, Body Armor and tolerating well. Suggestions for more soft foods sent through [facility] per request.     Bowels:   Passing gas.  Has had loose stools this morning. Not taking stool softener. Pt will continue to monitor.     Activity:   No difficulty with ADLs reported.  Ambulating independently around home without issue. Reviewed activity restrictions.     Post op/follow up plans:   Post op appointment scheduled,confirmed date and time with patient.       Future Appointments   Date Time Provider Department Center   11/27/2023  1:00 PM [id number]   11/27/2023  3:00 PM Vikki Celaya APRN [facility]         Patient has our direct number for any questions or concerns that may arise.      Torie Garcia, RN BSN  Thoracic Surgery RN Care Coordinator  [phone]

## 2023-11-06 NOTE — PLAN OF CARE
Physical Therapy Discharge Summary    Reason for therapy discharge:    Discharged to home.    Progress towards therapy goal(s). See goals on Care Plan in [facility] electronic health record for goal details.  Goals met    Therapy recommendation(s):    No further therapy is recommended.

## 2023-11-07 ENCOUNTER — DOCUMENTATION ONLY (OUTPATIENT)
Dept: OTHER | Facility: CLINIC | Age: 83
End: 2023-11-07
Payer: MEDICARE

## 2023-11-10 ENCOUNTER — DOCUMENTATION ONLY (OUTPATIENT)
Dept: OTHER | Facility: CLINIC | Age: 83
End: 2023-11-10
Payer: MEDICARE

## 2023-11-27 ENCOUNTER — ANCILLARY PROCEDURE (OUTPATIENT)
Dept: GENERAL RADIOLOGY | Facility: CLINIC | Age: 83
End: 2023-11-27
Attending: CLINICAL NURSE SPECIALIST
Payer: MEDICARE

## 2023-11-27 ENCOUNTER — ONCOLOGY VISIT (OUTPATIENT)
Dept: SURGERY | Facility: CLINIC | Age: 83
End: 2023-11-27
Attending: CLINICAL NURSE SPECIALIST
Payer: MEDICARE

## 2023-11-27 VITALS
HEART RATE: 68 BPM | DIASTOLIC BLOOD PRESSURE: 84 MMHG | RESPIRATION RATE: 16 BRPM | BODY MASS INDEX: 22.9 KG/M2 | TEMPERATURE: 97.7 F | OXYGEN SATURATION: 96 % | WEIGHT: 129.3 LBS | SYSTOLIC BLOOD PRESSURE: 145 MMHG

## 2023-11-27 DIAGNOSIS — Z43.1 PEG (PERCUTANEOUS ENDOSCOPIC GASTROSTOMY) ADJUSTMENT/REPLACEMENT/REMOVAL (H): ICD-10-CM

## 2023-11-27 DIAGNOSIS — K44.9 HIATAL HERNIA: ICD-10-CM

## 2023-11-27 DIAGNOSIS — K44.9 HIATAL HERNIA: Primary | ICD-10-CM

## 2023-11-27 PROCEDURE — 99024 POSTOP FOLLOW-UP VISIT: CPT | Performed by: CLINICAL NURSE SPECIALIST

## 2023-11-27 PROCEDURE — 74220 X-RAY XM ESOPHAGUS 1CNTRST: CPT | Mod: GC | Performed by: STUDENT IN AN ORGANIZED HEALTH CARE EDUCATION/TRAINING PROGRAM

## 2023-11-27 PROCEDURE — G0463 HOSPITAL OUTPT CLINIC VISIT: HCPCS | Performed by: CLINICAL NURSE SPECIALIST

## 2023-11-27 ASSESSMENT — PAIN SCALES - GENERAL: PAINLEVEL: EXTREME PAIN (9)

## 2023-11-27 NOTE — LETTER
11/27/2023         RE: Sonja Macias  [id number] [facility] [address]        Dear Colleague,    Thank you for referring your patient, Sonja Macias, to the [facility]. Please see a copy of my visit note below.    THORACIC SURGERY FOLLOW UP VISIT      I saw Ms. Macias in follow-up today. The clinical summary follows:     PREOP DIAGNOSIS   Hiatal hernia  PROCEDURE   Robot assisted laparoscopic repair of hiatal hernia and gastrostomy tube placement    DATE OF PROCEDURE  11/01/2023    COMPLICATIONS  None    INTERVAL STUDIES  Esophagram: No findings to suggest a postoperative leak. The gastroesophageal junction appears to be appropriately located. Of note, an air-fluid level is seen along the right lateral aspect of the distal esophagus. This did not fill with contrast throughout the examination but is ultimately indeterminate.     Past Medical History:   Diagnosis Date    Anemia     Anemia, iron deficiency     Fibromyalgia     Gastroesophageal reflux disease     Hiatal hernia     Hypertension       Past Surgical History:   Procedure Laterality Date    CHOLECYSTECTOMY      DAVINCI NISSEN FUNDOPLICATION N/A 11/1/2023    Procedure: Robot-Assisted Laparoscopic Hiatal Hernia Repair wih Esophagogastroscopy, Left Chest Tube;  Surgeon: Skyla Dobbs MD;  Location: [facility] OR    UPPER GI ENDOSCOPY        Social History     Socioeconomic History    Marital status:      Spouse name: Not on file    Number of children: Not on file    Years of education: Not on file    Highest education level: Not on file   Occupational History    Not on file   Tobacco Use    Smoking status: Never    Smokeless tobacco: Never   Substance and Sexual Activity    Alcohol use: Yes     Comment: <1 drinks per week    Drug use: Never    Sexual activity: Not on file   Other Topics Concern    Not on file   Social History Narrative    Not on file     Social Determinants of Health     Financial Resource Strain:  Not on file   Food Insecurity: Not on file   Transportation Needs: Not on file   Physical Activity: Not on file   Stress: Not on file   Social Connections: Not on file   Interpersonal Safety: Not on file   Housing Stability: Not on file      SUBJECTIVE   Jess is doing well. Her main complaint is about the feeding tube. The only discomfort she has is related to the tube. She has only taken Tylenol for pain. She is eating and drinking without food getting stuck. She does make sure to not over eat as this increases GI discomfort. She has not had to vent her tube for bloating or nausea. She would love for the tube to be removed today. She is having regular bowel movements.    OBJECTIVE  BP (!) 145/84 (BP Location: Right arm, Patient Position: Sitting, Cuff Size: Adult Regular)   Pulse 68   Temp 97.7  F (36.5  C) (Oral)   Resp 16   Wt 58.7 kg (129 lb 4.8 oz)   SpO2 96%   BMI 22.90 kg/m       I explained the process of removing the gastrostomy tube in clinic and told her about the risk for pain, increased drainage, bleeding and retained bumper. Jess verbally consented to have the tube removed in clinic.    I removed the tube with bumper intact. There was scant bleeding. Jess tolerated this well. I applied a clean dressing and gave her a cold pack to help with any discomfort. I gave her the following site care instructions:    It may take 10-14 days for this to heal completely.    Keep a gauze dressing on and change it daily and as needed.    There are no dietary restrictions.    You may hear air gurgling noises coming from this site until it closes.    It is OK to shower but no tubs, pools, etc until healed.    If, after 3-4 weeks, you still have a little drainage and it hasn't closed, let us know.      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    IMPRESSION   83 year-old female status post robot assisted laparoscopic hiatal hernia repair and gastrostomy  tube placement. She is here today for post operative follow up and possible gastrostomy tube removal.     PLAN  I spent 25 min on the date of the encounter in chart review, patient visit, review of tests, documentation and/or discussion with other providers about the issues documented above. I reviewed the plan as follows:  Gastrostomy site care instructions as per above. Follow up with me in 1 year with esophagram prior.  1. Necessary Tests & Appointments: esophagram    All questions were answered and the patient and present family were in agreement with the plan.  I appreciate the opportunity to participate in the care of your patient and will keep you updated.  Sincerely,    Vikki Celaya, LYNDSAY CNS

## 2023-11-27 NOTE — PROGRESS NOTES
THORACIC SURGERY FOLLOW UP VISIT      I saw Ms. Macias in follow-up today. The clinical summary follows:     PREOP DIAGNOSIS   Hiatal hernia  PROCEDURE   Robot assisted laparoscopic repair of hiatal hernia and gastrostomy tube placement    DATE OF PROCEDURE  11/01/2023    COMPLICATIONS  None    INTERVAL STUDIES  Esophagram: No findings to suggest a postoperative leak. The gastroesophageal junction appears to be appropriately located. Of note, an air-fluid level is seen along the right lateral aspect of the distal esophagus. This did not fill with contrast throughout the examination but is ultimately indeterminate.     Past Medical History:   Diagnosis Date    Anemia     Anemia, iron deficiency     Fibromyalgia     Gastroesophageal reflux disease     Hiatal hernia     Hypertension       Past Surgical History:   Procedure Laterality Date    CHOLECYSTECTOMY      DAVINCI NISSEN FUNDOPLICATION N/A 11/1/2023    Procedure: Robot-Assisted Laparoscopic Hiatal Hernia Repair wih Esophagogastroscopy, Left Chest Tube;  Surgeon: Skyla Dobbs MD;  Location: [facility] OR    UPPER GI ENDOSCOPY        Social History     Socioeconomic History    Marital status:      Spouse name: Not on file    Number of children: Not on file    Years of education: Not on file    Highest education level: Not on file   Occupational History    Not on file   Tobacco Use    Smoking status: Never    Smokeless tobacco: Never   Substance and Sexual Activity    Alcohol use: Yes     Comment: <1 drinks per week    Drug use: Never    Sexual activity: Not on file   Other Topics Concern    Not on file   Social History Narrative    Not on file     Social Determinants of Health     Financial Resource Strain: Not on file   Food Insecurity: Not on file   Transportation Needs: Not on file   Physical Activity: Not on file   Stress: Not on file   Social Connections: Not on file   Interpersonal Safety: Not on file   Housing Stability: Not on file      SUBJECTIVE   Jess  is doing well. Her main complaint is about the feeding tube. The only discomfort she has is related to the tube. She has only taken Tylenol for pain. She is eating and drinking without food getting stuck. She does make sure to not over eat as this increases GI discomfort. She has not had to vent her tube for bloating or nausea. She would love for the tube to be removed today. She is having regular bowel movements.    OBJECTIVE  BP (!) 145/84 (BP Location: Right arm, Patient Position: Sitting, Cuff Size: Adult Regular)   Pulse 68   Temp 97.7  F (36.5  C) (Oral)   Resp 16   Wt 58.7 kg (129 lb 4.8 oz)   SpO2 96%   BMI 22.90 kg/m       I explained the process of removing the gastrostomy tube in clinic and told her about the risk for pain, increased drainage, bleeding and retained bumper. Jess verbally consented to have the tube removed in clinic.    I removed the tube with bumper intact. There was scant bleeding. Jess tolerated this well. I applied a clean dressing and gave her a cold pack to help with any discomfort. I gave her the following site care instructions:    It may take 10-14 days for this to heal completely.    Keep a gauze dressing on and change it daily and as needed.    There are no dietary restrictions.    You may hear air gurgling noises coming from this site until it closes.    It is OK to shower but no tubs, pools, etc until healed.    If, after 3-4 weeks, you still have a little drainage and it hasn't closed, let us know.      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    IMPRESSION   83 year-old female status post robot assisted laparoscopic hiatal hernia repair and gastrostomy tube placement. She is here today for post operative follow up and possible gastrostomy tube removal.     PLAN  I spent 25 min on the date of the encounter in chart review, patient visit, review of tests, documentation and/or discussion with other providers  about the issues documented above. I reviewed the plan as follows:  Gastrostomy site care instructions as per above. Follow up with me in 1 year with esophagram prior.  1. Necessary Tests & Appointments: esophagram    All questions were answered and the patient and present family were in agreement with the plan.  I appreciate the opportunity to participate in the care of your patient and will keep you updated.  Sincerely,

## 2023-11-27 NOTE — NURSING NOTE
"Oncology Rooming Note    November 27, 2023 2:49 PM   Sonja Macias is a 83 year old female who presents for:    Chief Complaint   Patient presents with    Oncology Clinic Visit     Hiatal hernia     Initial Vitals: BP (!) 145/84 (BP Location: Right arm, Patient Position: Sitting, Cuff Size: Adult Regular)   Pulse 68   Temp 97.7  F (36.5  C) (Oral)   Resp 16   Wt 58.7 kg (129 lb 4.8 oz)   SpO2 96%   BMI 22.90 kg/m   Estimated body mass index is 22.9 kg/m  as calculated from the following:    Height as of 11/1/23: 1.6 m (5' 3\").    Weight as of this encounter: 58.7 kg (129 lb 4.8 oz). Body surface area is 1.62 meters squared.  Extreme Pain (9) Comment: worsens with movement   No LMP recorded. Patient is postmenopausal.  Allergies reviewed: Yes  Medications reviewed: Yes    Medications: Medication refills not needed today.  Pharmacy name entered into EPIC:    [facility] - [facility], [place] - [address] [place] [address] PHARMACY - [place], [place] - [address] [facility]    Clinical concerns: Pt experiences extreme pain in their incision site when moving around causing tubing to shift. Pain is absent when sitting still but can go up to an 8 or 9 when moving.        Angie Turcios              "

## 2024-05-19 ENCOUNTER — HEALTH MAINTENANCE LETTER (OUTPATIENT)
Age: 84
End: 2024-05-19

## 2024-11-18 ENCOUNTER — ONCOLOGY VISIT (OUTPATIENT)
Dept: SURGERY | Facility: CLINIC | Age: 84
End: 2024-11-18
Payer: MEDICARE

## 2024-11-18 VITALS
OXYGEN SATURATION: 97 % | SYSTOLIC BLOOD PRESSURE: 157 MMHG | RESPIRATION RATE: 20 BRPM | BODY MASS INDEX: 24.87 KG/M2 | HEART RATE: 61 BPM | TEMPERATURE: 97.8 F | WEIGHT: 140.4 LBS | DIASTOLIC BLOOD PRESSURE: 84 MMHG

## 2024-11-18 DIAGNOSIS — K44.9 HIATAL HERNIA: Primary | ICD-10-CM

## 2024-11-18 PROCEDURE — G0463 HOSPITAL OUTPT CLINIC VISIT: HCPCS | Performed by: CLINICAL NURSE SPECIALIST

## 2024-11-18 PROCEDURE — 99213 OFFICE O/P EST LOW 20 MIN: CPT | Performed by: CLINICAL NURSE SPECIALIST

## 2024-11-18 RX ORDER — CYCLOBENZAPRINE HCL 5 MG
1 TABLET ORAL
COMMUNITY
Start: 2024-02-12

## 2024-11-18 RX ORDER — AMLODIPINE BESYLATE 10 MG/1
10 TABLET ORAL DAILY
COMMUNITY
Start: 2024-11-14

## 2024-11-18 ASSESSMENT — PAIN SCALES - GENERAL: PAINLEVEL_OUTOF10: NO PAIN (0)

## 2024-11-18 NOTE — PROGRESS NOTES
THORACIC SURGERY FOLLOW UP VISIT      I saw Ms. Macias in follow-up today. The clinical summary follows:     PREOP DIAGNOSIS   Hiatal hernia  PROCEDURE   Robot assisted laparoscopic repair of hiatal hernia and gastrostomy tube placement    DATE OF PROCEDURE  11/01/2023    COMPLICATIONS  None    INTERVAL STUDIES  Esophagram 11/18/2024:  Patent gastroesophageal junction.  No evidence of recurrent hiatal hernia.    Past Medical History:   Diagnosis Date    Anemia     Anemia, iron deficiency     Fibromyalgia     Gastroesophageal reflux disease     Hiatal hernia     Hypertension      Past Surgical History:   Procedure Laterality Date    CHOLECYSTECTOMY      DAVINCI NISSEN FUNDOPLICATION N/A 11/1/2023    Procedure: Robot-Assisted Laparoscopic Hiatal Hernia Repair wih Esophagogastroscopy, Left Chest Tube;  Surgeon: Skyla Dobbs MD;  Location: [facility] OR    UPPER GI ENDOSCOPY        Social History     Socioeconomic History    Marital status:      Spouse name: Not on file    Number of children: Not on file    Years of education: Not on file    Highest education level: Not on file   Occupational History    Not on file   Tobacco Use    Smoking status: Never    Smokeless tobacco: Never   Substance and Sexual Activity    Alcohol use: Yes     Comment: <1 drinks per week    Drug use: Never    Sexual activity: Not on file   Other Topics Concern    Not on file   Social History Narrative    Not on file     Social Drivers of Health     Financial Resource Strain: Low Risk  (2/10/2024)    [facility]    Overall Financial Resource Strain (CARDIA)     Difficulty of Paying Living Expenses: Not hard at all   Food Insecurity: No Food Insecurity (2/10/2024)    [facility]    Hunger Vital Sign     Worried About Running Out of Food in the Last Year: Never true     Ran Out of Food in the Last Year: Never true   Transportation Needs: No  Transportation Needs (2/10/2024)    Received from [facility]    PRAPARE - Transportation     Lack of Transportation (Medical): No     Lack of Transportation (Non-Medical): No   Physical Activity: Insufficiently Active (2/10/2024)    Received from [facility] [employer] [facility]    Exercise Vital Sign     Days of Exercise per Week: 2 days     Minutes of Exercise per Session: 30 min   Stress: No Stress Concern Present (2/10/2024)    Received from [facility] [facility]    [employer] - Occupational Stress Questionnaire     Feeling of Stress : Only a little   Social Connections: Moderately Integrated (2/10/2024)    Received from [facility] [employer] [facility]    Social Connection and Isolation Panel [NHANES]     Frequency of Communication with Friends and Family: More than three times a week     Frequency of Social Gatherings with Friends and Family: More than three times a week     Attends Anglican Services: More than 4 times per year     Active Member of Clubs or Organizations: Yes     Attends Club or Organization Meetings: More than 4 times per year     Marital Status:    Interpersonal Safety: Not At Risk (2/10/2024)    [facility] [facility]    Humiliation, Afraid, Rape, and Kick questionnaire     Fear of Current or Ex-Partner: No     Emotionally Abused: No     Physically Abused: No     Sexually Abused: No   Housing Stability: Low Risk  (2/10/2024)    Received from [facility] [employer] [facility]     Retired - What is your living situation today? : I have a steady place to live      SUBJECTIVE   Jess is doing well. She doesn't have trouble with food getting stuck but sometimes she feels full very quickly and this can last a couple of hours. It is not happening every day and she is able to manage it.      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 She is here with her daughter, Meaghan who is a .    IMPRESSION   84 year-old female status post robot assisted laparoscopic hiatal hernia repair and gastrostomy tube placement. She is here today for one year follow up.    PLAN  I spent 25 min on the date of the encounter in chart review, patient visit, review of tests, documentation and/or discussion with other providers about the issues documented above. I reviewed the plan as follows:  Follow up with me in 1 year with an esophagram prior.  All questions were answered and the patient and present family were in agreement with the plan.  I appreciate the opportunity to participate in the care of your patient and will keep you updated.  Sincerely,

## 2024-11-18 NOTE — NURSING NOTE
"Oncology Rooming Note    November 18, 2024 3:05 PM   Sonja Macias is a 84 year old female who presents for:    Chief Complaint   Patient presents with    Oncology Clinic Visit     Hiatal hernia     Initial Vitals: BP (!) 157/84 (BP Location: Left arm, Patient Position: Sitting, Cuff Size: Adult Regular)   Pulse 61   Temp 97.8  F (36.6  C) (Oral)   Resp 20   Wt 63.7 kg (140 lb 6.4 oz)   SpO2 97%   BMI 24.87 kg/m   Estimated body mass index is 24.87 kg/m  as calculated from the following:    Height as of 11/1/23: 1.6 m (5' 3\").    Weight as of this encounter: 63.7 kg (140 lb 6.4 oz). Body surface area is 1.68 meters squared.  No Pain (0) Comment: Data Unavailable   No LMP recorded. Patient is postmenopausal.  Allergies reviewed: Yes  Medications reviewed: Yes    Medications: Medication refills not needed today.  Pharmacy name entered into EPIC:    [facility] - [facility], [place] - [address] [place] [address] PHARMACY - [place], [place] - [address] [facility]    Frailty Screening:   Is the patient here for a new oncology consult visit in cancer care? 2. No      Clinical concerns: Patient states no new concerns to discuss with provider.        Bella Wilkerson, EMT            "

## 2024-11-18 NOTE — LETTER
11/18/2024      Sonja Macias  [address] [address]  [place] [address]      Dear Colleague,    Thank you for referring your patient, Sonja Macias, to the [facility]. Please see a copy of my visit note below.    THORACIC SURGERY FOLLOW UP VISIT      I saw Ms. Macias in follow-up today. The clinical summary follows:     PREOP DIAGNOSIS   Hiatal hernia  PROCEDURE   Robot assisted laparoscopic repair of hiatal hernia and gastrostomy tube placement    DATE OF PROCEDURE  11/01/2023    COMPLICATIONS  None    INTERVAL STUDIES  Esophagram 11/18/2024:  Patent gastroesophageal junction.  No evidence of recurrent hiatal hernia.    Past Medical History:   Diagnosis Date     Anemia      Anemia, iron deficiency      Fibromyalgia      Gastroesophageal reflux disease      Hiatal hernia      Hypertension      Past Surgical History:   Procedure Laterality Date     CHOLECYSTECTOMY       DAVINCI NISSEN FUNDOPLICATION N/A 11/1/2023    Procedure: Robot-Assisted Laparoscopic Hiatal Hernia Repair wih Esophagogastroscopy, Left Chest Tube;  Surgeon: Skyla Dobbs MD;  Location: [facility] OR     UPPER GI ENDOSCOPY        Social History     Socioeconomic History     Marital status:      Spouse name: Not on file     Number of children: Not on file     Years of education: Not on file     Highest education level: Not on file   Occupational History     Not on file   Tobacco Use     Smoking status: Never     Smokeless tobacco: Never   Substance and Sexual Activity     Alcohol use: Yes     Comment: <1 drinks per week     Drug use: Never     Sexual activity: Not on file   Other Topics Concern     Not on file   Social History Narrative     Not on file     Social Drivers of Health     Financial Resource Strain: Low Risk  (2/10/2024)    Received from [facility]    Overall Financial Resource Strain (CARDIA)      Difficulty of Paying Living Expenses: Not hard at all   Food Insecurity:  No Food Insecurity (2/10/2024)    [facility] beds at the [facility]    Hunger Vital Sign      Worried About Running Out of Food in the Last Year: Never true      Ran Out of Food in the Last Year: Never true   Transportation Needs: No Transportation Needs (2/10/2024)    [facility] [employer] [facility] 234 beds at the [facility]    PRAPARE - Transportation      Lack of Transportation (Medical): No      Lack of Transportation (Non-Medical): No   Physical Activity: Insufficiently Active (2/10/2024)    [facility]    Exercise Vital Sign      Days of Exercise per Week: 2 days      Minutes of Exercise per Session: 30 min   Stress: No Stress Concern Present (2/10/2024)    [facility]    [employer] [place] [employer] Occupational Health - Occupational Stress Questionnaire      Feeling of Stress : Only a little   Social Connections: Moderately Integrated (2/10/2024)    [facility]    Social Connection and Isolation Panel [NHANES]      Frequency of Communication with Friends and Family: More than three times a week      Frequency of Social Gatherings with Friends and Family: More than three times a week      Attends Yazdanism Services: More than 4 times per year      Active Member of Clubs or Organizations: Yes      Attends Club or Organization Meetings: More than 4 times per year      Marital Status:    Interpersonal Safety: Not At Risk (2/10/2024)    Received from [facility]    Humiliation, Afraid, Rape, and Kick questionnaire      Fear of Current or Ex-Partner: No      Emotionally Abused: No      Physically Abused: No      Sexually Abused: No   Housing Stability: Low Risk  (2/10/2024)    [facility] [employer] [facility] include 234 beds at the [facility]      Retired - What is your living situation  today? : I have a steady place to live      SUBJECTIVE   Jess is doing well. She doesn't have trouble with food getting stuck but sometimes she feels full very quickly and this can last a couple of hours. It is not happening every day and she is able to manage it.     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 She is here with her daughter, Meaghan who is a .    IMPRESSION   84 year-old female status post robot assisted laparoscopic hiatal hernia repair and gastrostomy tube placement. She is here today for one year follow up.    PLAN  I spent 25 min on the date of the encounter in chart review, patient visit, review of tests, documentation and/or discussion with other providers about the issues documented above. I reviewed the plan as follows:  Follow up with me in 1 year with an esophagram prior.  All questions were answered and the patient and present family were in agreement with the plan.  I appreciate the opportunity to participate in the care of your patient and will keep you updated.  Sincerely,      Again, thank you for allowing me to participate in the care of your patient.        Sincerely,        LYNDSAY Infante CNS

## 2025-03-16 ENCOUNTER — HEALTH MAINTENANCE LETTER (OUTPATIENT)
Age: 85
End: 2025-03-16

## (undated) DEVICE — CONNECTOR BLAKE DRAIN SGL BCC1

## (undated) DEVICE — LINEN TOWEL PACK X6 WHITE 5487

## (undated) DEVICE — SU VICRYL 3-0 SH 27" UND J416H

## (undated) DEVICE — DRAIN JACKSON PRATT ROUND SIL 19FR W/TROCAR LF JP-2232

## (undated) DEVICE — TIES BANDING T50R

## (undated) DEVICE — SUCTION DRY CHEST DRAIN OASIS 3600-100

## (undated) DEVICE — DRAPE SHEET MED 44X70" 9355

## (undated) DEVICE — DRSG PRIMAPORE 02X3" 7133

## (undated) DEVICE — DAVINCI XI GRASPER FENESTRATED TIP UP 8MM 470347

## (undated) DEVICE — SUCTION MANIFOLD NEPTUNE 2 SYS 4 PORT 0702-020-000

## (undated) DEVICE — DAVINCI XI FCP CADIERE 8MM ENDOWRIST 471049

## (undated) DEVICE — DAVINCI XI TISSUE SEALER SYNCROSEAL 8MM 480440

## (undated) DEVICE — DAVINCI XI DRAPE ARM 470015

## (undated) DEVICE — ESU PENCIL W/ROCKER SWITCH BLADE HOLSTER E2350HDB

## (undated) DEVICE — ENDO DISSECTOR KITTNER CIGARETTE ROLL4"X4" 15505/25

## (undated) DEVICE — DRSG STERI STRIP 1/2X4" R1547

## (undated) DEVICE — ESU ELEC BLADE 2.75" COATED/INSULATED E1455

## (undated) DEVICE — DAVINCI XI SEAL UNIVERSAL 5-8MM 470361

## (undated) DEVICE — DRAPE MAYO STAND 23X54 8337

## (undated) DEVICE — DRAPE LAPAROSCOPIC ABDOMINAL ASTOUND 106X124" LF 9438

## (undated) DEVICE — SOL WATER IRRIG 1000ML BOTTLE 2F7114

## (undated) DEVICE — SU SILK 0 SH 30" K834H

## (undated) DEVICE — SUCTION IRR STRYKERFLOW II W/TIP 250-070-520

## (undated) DEVICE — TUBING SUCTION 10'X3/16" N510

## (undated) DEVICE — LUBRICANT INST KIT ENDO-LUBE 220-90

## (undated) DEVICE — SYSTEM LAPAROVUE VISIBILITY LAPVUE10

## (undated) DEVICE — LINEN TOWEL PACK X5 5464

## (undated) DEVICE — TUBING SMOKE EVAC PNEUMOCLEAR HIGH FLOW 0620050250

## (undated) DEVICE — SOL ADH LIQUID BENZOIN SWAB 0.6ML C1544

## (undated) DEVICE — LUBRICANT INST ELECTROLUBE EL101

## (undated) DEVICE — SU MONOCRYL 4-0 PS-2 27" UND Y426H

## (undated) DEVICE — GLOVE BIOGEL PI MICRO SZ 6.5 48565

## (undated) DEVICE — NDL INSUFFLATION 13GA 120MM C2201

## (undated) DEVICE — ESU GROUND PAD ADULT W/CORD E7507

## (undated) DEVICE — SU SILK 2-0 SH 30" K833H

## (undated) DEVICE — ENDO TUBING CO2 SMARTCAP STERILE DISP 100145CO2EXT

## (undated) DEVICE — ENDO TROCAR CONMED AIRSEAL BLADELESS 12X120MM IAS12-120LP

## (undated) DEVICE — ENDO SEALING CAP SMARTCAP

## (undated) DEVICE — TUBING CONMED AIRSEAL SMOKE EVAC INSUFFLATION ASM-EVAC

## (undated) DEVICE — ESU PENCIL W/COATED BLADE E2450H

## (undated) DEVICE — DECANTER VIAL 2006S

## (undated) DEVICE — PREP CHLORAPREP 26ML TINTED HI-LITE ORANGE 930815

## (undated) DEVICE — KIT ENDO FIRST STEP DISINFECTANT 200ML W/POUCH EP-4

## (undated) DEVICE — DRAPE IOBAN INCISE 23X17" 6650EZ

## (undated) DEVICE — DAVINCI XI DRAPE COLUMN 470341

## (undated) DEVICE — DAVINCI XI ESU BIPOLAR LONG 78DEG ANG ENDOWRIST EXT 471400

## (undated) DEVICE — SU VICRYL 0 UR-6 27" J603H

## (undated) DEVICE — Device

## (undated) DEVICE — KIT PEG ENDOVIVE ENFIT 20 FR PULL M00509040

## (undated) DEVICE — KIT CONNECTOR FOR OLYMPUS ENDOSCOPES DEFENDO 100310

## (undated) DEVICE — DRSG DRAIN 2X2" 7087

## (undated) DEVICE — SU SILK 0 TIE 6X30" A306H

## (undated) DEVICE — ENDO TROCAR FIRST ENTRY KII FIOS Z-THRD 05X100MM CTF03

## (undated) DEVICE — SU PLEDGET SOFT TFE 13MMX7MMX1.5MM D7044

## (undated) RX ORDER — FENTANYL CITRATE 50 UG/ML
INJECTION, SOLUTION INTRAMUSCULAR; INTRAVENOUS
Status: DISPENSED
Start: 2023-11-01

## (undated) RX ORDER — SODIUM CHLORIDE, SODIUM LACTATE, POTASSIUM CHLORIDE, CALCIUM CHLORIDE 600; 310; 30; 20 MG/100ML; MG/100ML; MG/100ML; MG/100ML
INJECTION, SOLUTION INTRAVENOUS
Status: DISPENSED
Start: 2023-11-01

## (undated) RX ORDER — HYDROMORPHONE HYDROCHLORIDE 1 MG/ML
INJECTION, SOLUTION INTRAMUSCULAR; INTRAVENOUS; SUBCUTANEOUS
Status: DISPENSED
Start: 2023-11-01

## (undated) RX ORDER — CEFAZOLIN SODIUM 1 G/3ML
INJECTION, POWDER, FOR SOLUTION INTRAMUSCULAR; INTRAVENOUS
Status: DISPENSED
Start: 2023-11-01

## (undated) RX ORDER — PROPOFOL 10 MG/ML
INJECTION, EMULSION INTRAVENOUS
Status: DISPENSED
Start: 2023-11-01

## (undated) RX ORDER — HEPARIN SODIUM 5000 [USP'U]/.5ML
INJECTION, SOLUTION INTRAVENOUS; SUBCUTANEOUS
Status: DISPENSED
Start: 2023-11-01

## (undated) RX ORDER — CEFAZOLIN SODIUM/WATER 2 G/20 ML
SYRINGE (ML) INTRAVENOUS
Status: DISPENSED
Start: 2023-11-01

## (undated) RX ORDER — EPHEDRINE SULFATE 50 MG/ML
INJECTION, SOLUTION INTRAMUSCULAR; INTRAVENOUS; SUBCUTANEOUS
Status: DISPENSED
Start: 2023-11-01

## (undated) RX ORDER — HYDROMORPHONE HCL IN WATER/PF 6 MG/30 ML
PATIENT CONTROLLED ANALGESIA SYRINGE INTRAVENOUS
Status: DISPENSED
Start: 2023-11-01

## (undated) RX ORDER — DEXAMETHASONE SODIUM PHOSPHATE 4 MG/ML
INJECTION, SOLUTION INTRA-ARTICULAR; INTRALESIONAL; INTRAMUSCULAR; INTRAVENOUS; SOFT TISSUE
Status: DISPENSED
Start: 2023-11-01